# Patient Record
Sex: MALE | Race: WHITE
[De-identification: names, ages, dates, MRNs, and addresses within clinical notes are randomized per-mention and may not be internally consistent; named-entity substitution may affect disease eponyms.]

---

## 2017-01-14 ENCOUNTER — HOSPITAL ENCOUNTER (EMERGENCY)
Dept: HOSPITAL 25 - UCCORT | Age: 27
Discharge: HOME | End: 2017-01-14
Payer: COMMERCIAL

## 2017-01-14 VITALS — DIASTOLIC BLOOD PRESSURE: 72 MMHG | SYSTOLIC BLOOD PRESSURE: 124 MMHG

## 2017-01-14 DIAGNOSIS — Z88.6: ICD-10-CM

## 2017-01-14 DIAGNOSIS — K08.89: ICD-10-CM

## 2017-01-14 DIAGNOSIS — F17.210: ICD-10-CM

## 2017-01-14 DIAGNOSIS — K21.9: ICD-10-CM

## 2017-01-14 DIAGNOSIS — Z88.5: ICD-10-CM

## 2017-01-14 DIAGNOSIS — N34.2: Primary | ICD-10-CM

## 2017-01-14 DIAGNOSIS — Z91.048: ICD-10-CM

## 2017-01-14 PROCEDURE — 99212 OFFICE O/P EST SF 10 MIN: CPT

## 2017-01-14 PROCEDURE — 87491 CHLMYD TRACH DNA AMP PROBE: CPT

## 2017-01-14 PROCEDURE — 87591 N.GONORRHOEAE DNA AMP PROB: CPT

## 2017-01-14 PROCEDURE — 96372 THER/PROPH/DIAG INJ SC/IM: CPT

## 2017-01-14 PROCEDURE — G0463 HOSPITAL OUTPT CLINIC VISIT: HCPCS

## 2017-01-14 NOTE — UC
UC Dental HPI





- HPI Summary


HPI Summary: 





DENTAL PAIN X 7 DAYS , NO FEVER, NO CHILLS


PENILE DISCHARGE X 3 DAYS, + DYSURIA, , NO ABDOMINAL PAIN , NO NAUSEA OR 

VOMITING 





- History of Current Complaint


Chief Complaint: UCDentalProblem


Stated Complaint: DENTAL COMPLAINT,PERSONAL


Time Seen by Provider: 01/14/17 10:15


Hx Obtained From: Patient


Onset/Duration: Gradual Onset, Lasting Days - 5, Still Present


Severity: Moderate


Aggravating: Chewing


Alleviating: Nothing





- Allergies/Home Medications


Allergies/Adverse Reactions: 


 Allergies











Allergy/AdvReac Type Severity Reaction Status Date / Time


 


Meloxicam [From Mobic] Allergy Severe Itching Verified 01/14/17 09:51


 


Adhesive Tape Allergy  Rash Verified 01/14/17 09:51


 


Tramadol Allergy  Rash Verified 01/14/17 09:51














PMH/Surg Hx/FS Hx/Imm Hx


Endocrine History Of: 


   Denies: Diabetes, Thyroid Disease, Hyperthyroidism, Hypothyroidism, 

Dyslipidemia


Cardiovascular History Of: 


   Denies: Cardiac Disorders, Hypertension, Pacemaker/ICD, Myocardial Infarction

, Congestive Heart Failure, Atrial Fibrillation, Deep Vein Thrombosis, Bleeding 

Disorders


Respiratory History Of: 


   Denies: COPD, Asthma, Bronchitis, Pneumonia, Pulmonary Embolism


GI/ History Of: Reports: Gastroesophageal Reflux - He complains of heartburn 

but does not take anything for it.


   Denies: Ulcer, Gastrointestinal Bleed, Gall Bladder Disease, Kidney Stones, 

Diverticulitis, Renal Disease, Urosepsis


Neurological History Of: 


   Denies: TIA, CVA, Dementia, Seizures, Migraine


Psychological History Of: 


   Denies: Anxiety, Depression, Bipolar Disorder, Schizophrenia, Post Traumatic 

Stress Disorder


Cancer History Of: 


   Denies: Lung Cancer, Colorectal Cancer, Breast Cancer, Prostate Cancer, 

Cervical Cancer


Other History Of: 


   Negative For: HIV, Hepatitis B, Hepatitis C, Anticoagulant Therapy





- Surgical History


Surgical History: Yes


Surgery Procedure, Year, and Place: SX L WRIST FX.  Dental extractions 11/5/14.





- Family History


Known Family History: Positive: Hypertension


Family History: no family history of cardio, pulmonary vascular issues





- Social History


Alcohol Use: None


Substance Use Type: None


Substance Use Comment - Amount & Last Used: OCCASIONALLY


Smoking Status (MU): Heavy Every Day Tobacco Smoker


Type: Cigarettes


Amount Used/How Often: 1/2 PPD


Length of Time of Smoking/Using Tobacco: 10 yrs


Have You Smoked in the Last Year: Yes


When Did the Patient Quit Smoking/Using Tobacco: taking Chantix





- Immunization History


Most Recent Influenza Vaccination: 2015/2016


Most Recent Tetanus Shot: 2011





Review of Systems


Constitutional: Negative


Skin: Negative


Eyes: Negative


ENT: Dental Pain


Respiratory: Negative


Cardiovascular: Negative


Gastrointestinal: Negative


Genitourinary: Dysuria


All Other Systems Reviewed And Are Negative: Yes





Physical Exam


Triage Information Reviewed: Yes


Appearance: Well-Appearing, No Pain Distress, Well-Nourished


Vital Signs: 


 Initial Vital Signs











Temp  98.4 F   01/14/17 09:53


 


Pulse  102   01/14/17 09:53


 


Resp  16   01/14/17 09:53


 


BP  124/72   01/14/17 09:53


 


Pulse Ox  99   01/14/17 09:53











Vital Signs Reviewed: Yes


Eyes: Positive: Conjunctiva Clear


ENT: Positive: Normal ENT inspection, Hearing grossly normal, Pharynx normal


Dental: Positive: Percussion Tenderness @ - #14, Gross Decay/Caries @ - #14


Neck exam: Normal


Neck: Positive: Supple, Nontender, No Lymphadenopathy


Respiratory: Positive: Chest non-tender, Lungs clear, Normal breath sounds, No 

respiratory distress


Cardiovascular: Positive: RRR, No Murmur, Pulses Normal


Abdominal Exam: Normal


Abdomen Description: Positive: Nontender, Soft


Bowel Sounds: Positive: Present


Skin Exam: Normal





UC Physical Exam


Vital Signs On Initial Exam: 


 Initial Vitals











Temp Pulse Resp BP Pulse Ox


 


 98.4 F   102   16   124/72   99 


 


 01/14/17 09:53  01/14/17 09:53  01/14/17 09:53  01/14/17 09:53  01/14/17 09:53














- Genitalia Exam


Male Genitalia: Circumcised, Urethra With Discharge - CLEAR / WHITE


Male Genitalia Cont.: Bilateral: Testicles Descended





Dental Complaint Course/Dx





- Differential Dx/Diagnosis


Provider Diagnoses: DENTAL PAIN.  URETHRITIS





Discharge





- Discharge Plan


Condition: Stable


Disposition: HOME


Prescriptions: 


Naproxen [Naproxen 500 MG TABS] 500 mg PO BID #20 tab


Patient Education Materials:  Nonspecific Urethritis in Men (ED), Toothache (ED)


Referrals: 


No Primary Care Phys,NOPCP [Primary Care Provider] - 7 Days


Additional Instructions: 


WILL CHECK FOR GC/ [facility]


CALL THE OFFICE IN 2 DAYS FOR THE RESULTS 


WILL GIVE ROCEPHIN 250 MG IM AND ZITHROMAX 1000 MG PO X1

## 2017-01-26 ENCOUNTER — HOSPITAL ENCOUNTER (EMERGENCY)
Dept: HOSPITAL 25 - ED | Age: 27
Discharge: HOME | End: 2017-01-26
Payer: COMMERCIAL

## 2017-01-26 VITALS — DIASTOLIC BLOOD PRESSURE: 53 MMHG | SYSTOLIC BLOOD PRESSURE: 127 MMHG

## 2017-01-26 DIAGNOSIS — F17.210: ICD-10-CM

## 2017-01-26 DIAGNOSIS — N20.0: Primary | ICD-10-CM

## 2017-01-26 DIAGNOSIS — N13.30: ICD-10-CM

## 2017-01-26 LAB
ALBUMIN SERPL BCG-MCNC: 4.2 G/DL (ref 3.2–5.2)
ALP SERPL-CCNC: 46 U/L (ref 34–104)
ALT SERPL W P-5'-P-CCNC: 14 U/L (ref 7–52)
ANION GAP SERPL CALC-SCNC: 7 MMOL/L (ref 2–11)
AST SERPL-CCNC: 19 U/L (ref 13–39)
BUN SERPL-MCNC: 12 MG/DL (ref 6–24)
BUN/CREAT SERPL: 8.9 (ref 8–20)
CALCIUM SERPL-MCNC: 9.4 MG/DL (ref 8.6–10.3)
CHLORIDE SERPL-SCNC: 106 MMOL/L (ref 101–111)
GLOBULIN SER CALC-MCNC: 2.4 G/DL (ref 2–4)
GLUCOSE SERPL-MCNC: 99 MG/DL (ref 70–100)
HCO3 SERPL-SCNC: 25 MMOL/L (ref 22–32)
HCT VFR BLD AUTO: 45 % (ref 42–52)
HGB BLD-MCNC: 14.8 G/DL (ref 14–18)
MCH RBC QN AUTO: 31 PG (ref 27–31)
MCHC RBC AUTO-ENTMCNC: 33 G/DL (ref 31–36)
MCV RBC AUTO: 92 FL (ref 80–94)
POTASSIUM SERPL-SCNC: 4 MMOL/L (ref 3.5–5)
PROT SERPL-MCNC: 6.6 G/DL (ref 6.4–8.9)
RBC # BLD AUTO: 4.86 10^6/UL (ref 4–5.4)
SODIUM SERPL-SCNC: 138 MMOL/L (ref 133–145)
WBC # BLD AUTO: 12.4 10^3/UL (ref 3.5–10.8)

## 2017-01-26 PROCEDURE — 80053 COMPREHEN METABOLIC PANEL: CPT

## 2017-01-26 PROCEDURE — 86140 C-REACTIVE PROTEIN: CPT

## 2017-01-26 PROCEDURE — 81003 URINALYSIS AUTO W/O SCOPE: CPT

## 2017-01-26 PROCEDURE — 76775 US EXAM ABDO BACK WALL LIM: CPT

## 2017-01-26 PROCEDURE — 87086 URINE CULTURE/COLONY COUNT: CPT

## 2017-01-26 PROCEDURE — 81015 MICROSCOPIC EXAM OF URINE: CPT

## 2017-01-26 PROCEDURE — 99283 EMERGENCY DEPT VISIT LOW MDM: CPT

## 2017-01-26 PROCEDURE — 74000: CPT

## 2017-01-26 PROCEDURE — 85025 COMPLETE CBC W/AUTO DIFF WBC: CPT

## 2017-01-26 PROCEDURE — 36415 COLL VENOUS BLD VENIPUNCTURE: CPT

## 2017-01-26 NOTE — ED
Complaint/Male





- History of Current Complaint


Chief Complaint: EDUrogenitalProblems


Time Seen by Provider: 01/26/17 06:19


Hx Obtained From: Patient


Onset/Duration: Gradual Onset, Lasting Days - 2, Still Present


Timing: Constant


Severity Initially: Moderate


Severity Currently: Severe


Location: Flank - right


Character: Sharp


Aggravating Factor(s): Nothing


Alleviating Factor(s): Nothing





- Allergies/Home Medications


Allergies/Adverse Reactions: 


 Allergies











Allergy/AdvReac Type Severity Reaction Status Date / Time


 


Meloxicam [From Mobic] Allergy Severe Itching Verified 01/14/17 09:51


 


Adhesive Tape Allergy  Rash Verified 01/14/17 09:51


 


Tramadol Allergy  Rash Verified 01/14/17 09:51














PMH/Surg Hx/FS Hx/Imm Hx


Previously Healthy: Yes


Endocrine/Hematology History: 


   Denies: Hx Anticoagulant Therapy, Hx Diabetes, Hx Thyroid Disease


Cardiovascular History: 


   Denies: Hx Congestive Heart Failure, Hx Deep Vein Thrombosis, Hx Hypertension

, Hx Myocardial Infarction, Hx Pacemaker/ICD


Respiratory History: 


   Denies: Hx Asthma, Hx Chronic Obstructive Pulmonary Disease (COPD), Hx Lung 

Cancer, Hx Pneumonia, Hx Pulmonary Embolism


GI History: 


   Denies: Hx Gall Bladder Disease, Hx Gastrointestinal Bleed, Hx Ulcer, Hx 

Urosepsis


 History: 


   Denies: Hx Kidney Stones, Hx Renal Disease


Neurological History: 


   Denies: Hx Dementia, Hx Migraine, Hx Seizures, Hx Transient Ischemic Attacks 

(TIA)


Psychiatric History: Reports: Hx Substance Abuse


   Denies: Hx Anxiety, Hx Depression, Hx Schizophrenia, Hx Bipolar Disorder





- Surgical History


Surgery Procedure, Year, and Place: SX L WRIST FX.  Dental extractions 11/5/14.


Infectious Disease History: No


Infectious Disease History: 


   Denies: Hx Clostridium Difficile, Hx Hepatitis, Hx Human Immunodeficiency 

Virus (HIV), Hx of Known/Suspected MRSA, Hx Shingles, Hx Tuberculosis, Hx Known/

Suspected VRE, Hx Known/Suspected VRSA, History Other Infectious Disease, 

Traveled Outside the [place] in Last 30 Days





- Family History


Known Family History: Positive: Hypertension


Family History: no family history of cardio, pulmonary vascular issues





- Social History


Occupation: Employed Part-time


Lives: Alone


Alcohol Use: None


Substance Use Type: Reports: None


Substance Use Comment - Amount & Last Used: quit marijuana 7 mo ago


Smoking Status (MU): Heavy Every Day Tobacco Smoker


Type: Cigarettes


Amount Used/How Often: 1/2 PPD


Length of Time of Smoking/Using Tobacco: 10 yrs


Have You Smoked in the Last Year: Yes


Cessation Counseling: Patient Advised to Stop





Review Of Systems


Constitutional: Negative: Fever


Gastrointestinal: Negative: Abdominal Pain, Vomiting


Genitourinary: Positive: Other - flank pain


All Other Systems Reviewed And Are Negative: Yes





Physical Exam


Triage Information Reviewed: Yes


Vital Signs On Initial Exam: 


 Initial Vitals











Temp Pulse Resp BP Pulse Ox


 


 98.0 F   82   16   126/63   99 


 


 01/26/17 06:17  01/26/17 06:17  01/26/17 06:17  01/26/17 06:17  01/26/17 06:17











Vital Signs Reviewed: Yes


Appearance: Positive: Well-Appearing, No Pain Distress - Patient is resting 

comfortably on stretcher. He is pleasantly conversational and greets me with a 

smile and recognition from previous visits., Well-Nourished


Skin: Positive: Warm, Skin Color Reflects Adequate Perfusion, Dry, Soft


Head/Face: Positive: Normal Head/Face Inspection


Eyes: Positive: EOMI, KEENAN, Conjunctiva Clear


ENT: Positive: Hearing grossly normal


Respiratory/Lung Sounds: Positive: Clear to Auscultation, Breath Sounds Present


Cardiovascular: Positive: RRR


Abdomen Description: Positive: Nontender, Soft, CVA Tenderness (R) - patient 

reacts strongly to palpation.  Negative: CVA Tenderness (L), Distended, Guarding


Bowel Sounds: Positive: Present


Musculoskeletal: Positive: Strength/ROM Intact.  Negative: Edema Left, Edema 

Right


Neurological: Positive: Sensory/Motor Intact, Alert, Oriented to Person Place, 

Time, Normal Gait


Psychiatric: Positive: Affect/Mood Appropriate


AVPU Assessment: Alert





Diagnostics





- Vital Signs


 Vital Signs











  Temp Pulse Resp BP Pulse Ox


 


 01/26/17 06:17  98.0 F  82  16  126/63  99














- Laboratory


Lab Results: 


 Lab Results











  01/26/17 01/26/17 01/26/17 Range/Units





  06:33 06:33 06:33 


 


WBC  12.4 H    (3.5-10.8)  10^3/ul


 


RBC  4.86    (4.0-5.4)  10^6/ul


 


Hgb  14.8    (14.0-18.0)  g/dl


 


Hct  45    (42-52)  %


 


MCV  92    (80-94)  fL


 


MCH  31    (27-31)  pg


 


MCHC  33    (31-36)  g/dl


 


RDW  13    (10.5-15)  %


 


Plt Count  195    (150-450)  10^3/ul


 


MPV  9    (7.4-10.4)  um3


 


Neut % (Auto)  70.1    (38-83)  %


 


Lymph % (Auto)  20.1 L    (25-47)  %


 


Mono % (Auto)  8.1    (1-9)  %


 


Eos % (Auto)  1.4    (0-6)  %


 


Baso % (Auto)  0.3    (0-2)  %


 


Absolute Neuts (auto)  8.7 H    (1.5-7.7)  10^3/ul


 


Absolute Lymphs (auto)  2.5    (1.0-4.8)  10^3/ul


 


Absolute Monos (auto)  1.0 H    (0-0.8)  10^3/ul


 


Absolute Eos (auto)  0.2    (0-0.6)  10^3/ul


 


Absolute Basos (auto)  0    (0-0.2)  10^3/ul


 


Absolute Nucleated RBC  0    10^3/ul


 


Nucleated RBC %  0    


 


Sodium    138  (133-145)  mmol/L


 


Potassium    4.0  (3.5-5.0)  mmol/L


 


Chloride    106  (101-111)  mmol/L


 


Carbon Dioxide    25  (22-32)  mmol/L


 


Anion Gap    7  (2-11)  mmol/L


 


BUN    12  (6-24)  mg/dL


 


Creatinine    1.35 H  (0.67-1.17)  mg/dL


 


Est GFR ( Amer)    82.8  (>60)  


 


Est GFR (Non-Af Amer)    64.4  (>60)  


 


BUN/Creatinine Ratio    8.9  (8-20)  


 


Glucose    99  ()  mg/dL


 


Calcium    9.4  (8.6-10.3)  mg/dL


 


Total Bilirubin    0.40  (0.2-1.0)  mg/dL


 


AST    19  (13-39)  U/L


 


ALT    14  (7-52)  U/L


 


Alkaline Phosphatase    46  ()  U/L


 


C-Reactive Protein    1.99  (< 5.00)  mg/L


 


Total Protein    6.6  (6.4-8.9)  g/dL


 


Albumin    4.2  (3.2-5.2)  g/dL


 


Globulin    2.4  (2-4)  g/dL


 


Albumin/Globulin Ratio    1.8  (1-3)  


 


Urine Color   Yellow   


 


Urine Appearance   Cloudy   


 


Urine pH   5.0   (5-9)  


 


Ur Specific Gravity   1.027   (1.010-1.030)  


 


Urine Protein   1+(30 mg/dl) H   (Negative)  


 


Urine Ketones   Negative   (Negative)  


 


Urine Blood   3+ H   (Negative)  


 


Urine Nitrate   Negative   (Negative)  


 


Urine Bilirubin   Negative   (Negative)  


 


Urine Urobilinogen   Negative   (Negative)  


 


Ur Leukocyte Esterase   2+ H   (Negative)  


 


Urine WBC (Auto)   3+(>20/hpf) H   (Absent)  


 


Urine RBC (Auto)   3+(>10/hpf) H   (Absent)  


 


Ur Squamous Epith Cells   Present H   (Absent)  


 


Uric Acid Crystals   Present H   (Absent)  


 


Urine Bacteria   Absent   (Absent)  


 


Urine Sperm   Present H   (Absent)  


 


Urine Glucose   Negative   (Negative)  











Result Diagrams: 


 01/26/17 06:33





 01/26/17 06:33


Lab Statement: Any lab studies that have been ordered have been reviewed, and 

results considered in the medical decision making process.





- Radiology


  ** No standard instances


Xray Interpretation: No Acute Changes - No calculi noted on this study


Radiology Interpretation Completed By: Radiologist





- Ultrasound


  ** No standard instances


Ultrasound Interpretation: Positive (See Comments)


Ultrasound Interpretation Completed By: Radiologist - 5mm calculi at the right 

UVJ with miild hydronephrosis





 Complaint Male Course/Dx





- Course


Course Of Treatment: Patient's pain improved without pain medication and he 

asked to leave, and will follow-up with urology doctors he was given 

information on from [facility].





- Differential Dx/Diagnosis


Differential Diagnosis/HQI/PQRI: Epididymitis, Pyelonephritis, Testicular 

Torsion, Ureteral Calculi, Urinary Tract Infection


Provider Diagnoses: Right renal calculi

## 2017-01-26 NOTE — RAD
INDICATION:  Right flank abdominal pain.



COMPARISON:  Comparison is made with a prior right renal ultrasound study of the same day.



TECHNIQUE: Frontal supine films of the abdomen were obtained.



FINDINGS: The small bowel and colon appear nondistended. 



The calculus at the right ureterovesical junction noted on the ultrasound study is not

visualized on this x-ray study.



IMPRESSION:  NEGATIVE EXAM, THE PREVIOUSLY NOTED RIGHT URETERAL URETEROVESICAL JUNCTION

CALCULUS IS NOT VISIBLE ON THIS STUDY.

## 2017-01-26 NOTE — RAD
Indication: Right flank pain.



Real-time sonography of the kidneys was performed.



The right kidney measures 10.3 x 4.6 x 5.7 cm with mild hydronephrosis. The right ureter

is mildly distended. There is a 5 mm calculus in the right ureterovesicular junction.

There are bilateral ureteral jets identified however.



IMPRESSION: Mild right hydronephrosis with hydroureter. 5 mm calculi is noted in the right

ureterovesicular junction.

## 2017-07-05 ENCOUNTER — HOSPITAL ENCOUNTER (EMERGENCY)
Dept: HOSPITAL 25 - UCEAST | Age: 27
Discharge: HOME | End: 2017-07-05
Payer: COMMERCIAL

## 2017-07-05 VITALS — SYSTOLIC BLOOD PRESSURE: 107 MMHG | DIASTOLIC BLOOD PRESSURE: 60 MMHG
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
## 2017-07-05 DIAGNOSIS — S61.210A: Primary | ICD-10-CM

## 2017-07-05 DIAGNOSIS — Z23: ICD-10-CM

## 2017-07-05 PROCEDURE — 90471 IMMUNIZATION ADMIN: CPT

## 2017-07-05 PROCEDURE — 99213 OFFICE O/P EST LOW 20 MIN: CPT

## 2017-07-05 PROCEDURE — 12001 RPR S/N/AX/GEN/TRNK 2.5CM/<: CPT

## 2017-07-05 PROCEDURE — 99212 OFFICE O/P EST SF 10 MIN: CPT

## 2017-07-05 PROCEDURE — G0463 HOSPITAL OUTPT CLINIC VISIT: HCPCS

## 2017-07-05 NOTE — UC
Laceration HPI





- HPI Summary


HPI Summary: 


WAS WASHING DISHES ABOUT 2 HRS PTA., GLASS BROKE AND LACERATED RIGHT INDEX 

FINGER. LAST TETANUS 2-3 YEARS AGO.





- History Of Current Complaint


Chief Complaint: UCLaceration


Stated Complaint: FINGER LAC


Time Seen by Provider: 07/05/17 20:48


Hx Obtained From: Patient


Laceration Location: Finger - RIGHT INDEX


Mechanism Of Injury: Sharp Trauma


Onset/Duration: Sudden Onset, Lasting Hours, Still Present


Severity: Moderate


Pain Intensity: 4


Pain Scale Used: 0-10 Numeric


Aggravating Factors: Movement





- Allergies/Home Medications


Allergies/Adverse Reactions: 


 Allergies











Allergy/AdvReac Type Severity Reaction Status Date / Time


 


Meloxicam [From Tripvisto] Allergy Severe Itching Verified 07/05/17 20:11


 


Adhesive Tape Allergy  Rash Verified 07/05/17 20:11


 


Tramadol Allergy  Rash Verified 07/05/17 20:11











Home Medications: 


 Home Medications





NK [No Home Medications Reported]  07/05/17 [History Confirmed 07/05/17]











PMH/Surg Hx/FS Hx/Imm Hx





- Additional Past Medical History


Additional PMH: 





ADHD


Other History Of: 


   Negative For: HIV, Hepatitis B, Hepatitis C, Anticoagulant Therapy





- Surgical History


Surgical History: Yes


Surgery Procedure, Year, and Place: SX L WRIST FX.  Dental extractions 11/5/14.





- Family History


Known Family History: 


   Negative: Hypertension


Family History: no family history of cardio, pulmonary vascular issues





- Social History


Alcohol Use: None


Substance Use Type: None


Substance Use Comment - Amount & Last Used: quit marijuana 7 mo ago


Smoking Status (MU): Heavy Every Day Tobacco Smoker


Type: Cigarettes


Amount Used/How Often: 1/2 PPD


Length of Time of Smoking/Using Tobacco: 10 yrs


Have You Smoked in the Last Year: Yes


When Did the Patient Quit Smoking/Using Tobacco: taking Chantix





- Immunization History


Most Recent Influenza Vaccination: 2015/2016


Most Recent Tetanus Shot: 2011





Review of Systems


Constitutional: Negative


Skin: Other - LACERATION RIGHT INDEX FINGER


Respiratory: Negative


Cardiovascular: Negative


Gastrointestinal: Negative


Neurovascular: Negative


All Other Systems Reviewed And Are Negative: Yes





Physical Exam


Triage Information Reviewed: Yes


Appearance: Well-Appearing, No Pain Distress, Well-Nourished


Vital Signs: 


 Initial Vital Signs











Temp  97.5 F   07/05/17 20:07


 


Pulse  82   07/05/17 20:07


 


Resp  16   07/05/17 20:07


 


BP  107/60   07/05/17 20:07











Vital Signs Reviewed: Yes


Eyes: Positive: Conjunctiva Clear


ENT: Positive: Hearing grossly normal


Neck: Positive: Supple


Respiratory: Positive: No respiratory distress, No accessory muscle use


Cardiovascular: Positive: Pulses Normal


Abdomen Description: Positive: Soft


Musculoskeletal: Positive: ROM Intact, No Edema


Neurological: Positive: Alert


Psychological: Positive: Age Appropriate Behavior


Skin: Positive: Other - 2.5CM LINEAR LACERATION RIGHT INDEX FINGER JUST DISTAL 

TO MCP JOINT. SLIGHTLY GAPING. NO ACTIVE BLEEDING





Laceration Repair





- Laceration Repair


  ** 1


Description: Linear


Laceration Size After Repair: Length (cm) - 2.5CM, Width (mm) - 0MM, Depth (mm) 

- 2MM


Modified For Repair: No


Irrigation With Pressure Irrigation Device: Yes


Closure Material: SteriStrips





Laceration Course/Dx





- Course/Dx


Course Of Treatment: ADVISED PT THAT SUTURES WERE MORE APPROPRIATE FOR CLOSURE 

OF THIS WOUND. PT DECLINES. PREFERS GLUE OR STERISTRIPS. WOUND SLIGHTLY GAPING 

- NOT SUITABLE FOR GLUE. SKIN EDGES PULLED TOGETHER USING STERISTRIPS WITH 

SATISFACTORY RESULTS.





- Differential Dx - Laceration/Wound


Provider Diagnoses: LACERATION REPAIR RIGHT INDEX FINGER - STERISTRIPS





Discharge





- Discharge Plan


Condition: Stable


Disposition: HOME


Patient Education Materials:  Finger Laceration (ED)


Additional Instructions: 


THE STERISTRIPS WILL FALL OFF ON THEIR OWN IN THE NEXT 1-2 WEEKS. DO NOT PUT 

ANY OINTMENT ON TOP OF THEM. DO NOT SUBMERGE IN WATER FOR PROLONGED PERIOD OF 

TIME. OKAY FOR BRIEF SHOWER AFTER 24 HOURS AND THEN BE SURE TO DRY COMPLETELY.





TRY NOT TO BEND YOUR FINGER WHILE YOU ARE HEALING. SPLINT PROVIDED FOR USE AS 

NEEDED.





SEEK FOLLOW-UP IF YOU DEVELOP SPREADING REDNESS OF THE SKIN, PURULENT DRAINAGE, 

FEVER, INCREASED PAIN OR ANY OTHER CONCERNING SYMPTOMS.





CALL THE NUMBER BELOW FOR ASSISTANCE IN ESTABLISHING WITH A PCP


An additional resource available to assist in finding the appropriate physician 

for your health care needs is [facility] ([facility]).  

You may contact them by calling [phone].

## 2017-08-07 ENCOUNTER — HOSPITAL ENCOUNTER (EMERGENCY)
Dept: HOSPITAL 25 - UCEAST | Age: 27
Discharge: HOME | End: 2017-08-07
Payer: COMMERCIAL

## 2017-08-07 ENCOUNTER — HOSPITAL ENCOUNTER (EMERGENCY)
Dept: HOSPITAL 25 - ED | Age: 27
Discharge: HOME | End: 2017-08-07
Payer: COMMERCIAL

## 2017-08-07 VITALS — SYSTOLIC BLOOD PRESSURE: 134 MMHG | DIASTOLIC BLOOD PRESSURE: 70 MMHG

## 2017-08-07 VITALS — SYSTOLIC BLOOD PRESSURE: 144 MMHG | DIASTOLIC BLOOD PRESSURE: 82 MMHG

## 2017-08-07 DIAGNOSIS — S02.5XXA: Primary | ICD-10-CM

## 2017-08-07 DIAGNOSIS — T39.1X5A: ICD-10-CM

## 2017-08-07 DIAGNOSIS — K08.89: Primary | ICD-10-CM

## 2017-08-07 DIAGNOSIS — F17.210: ICD-10-CM

## 2017-08-07 DIAGNOSIS — F17.290: ICD-10-CM

## 2017-08-07 DIAGNOSIS — Z88.5: ICD-10-CM

## 2017-08-07 DIAGNOSIS — K08.89: ICD-10-CM

## 2017-08-07 LAB
ALBUMIN SERPL BCG-MCNC: 3.9 G/DL (ref 3.2–5.2)
ALP SERPL-CCNC: 42 U/L (ref 34–104)
ALT SERPL W P-5'-P-CCNC: 21 U/L (ref 7–52)
ANION GAP SERPL CALC-SCNC: 4 MMOL/L (ref 2–11)
APAP SERPL-MCNC: < 15 MCG/ML
AST SERPL-CCNC: 19 U/L (ref 13–39)
BUN SERPL-MCNC: 14 MG/DL (ref 6–24)
BUN/CREAT SERPL: 13.9 (ref 8–20)
CALCIUM SERPL-MCNC: 9 MG/DL (ref 8.6–10.3)
CHLORIDE SERPL-SCNC: 105 MMOL/L (ref 101–111)
GLOBULIN SER CALC-MCNC: 2.5 G/DL (ref 2–4)
GLUCOSE SERPL-MCNC: 109 MG/DL (ref 70–100)
HCO3 SERPL-SCNC: 27 MMOL/L (ref 22–32)
HCT VFR BLD AUTO: 45 % (ref 42–52)
HGB BLD-MCNC: 14.9 G/DL (ref 14–18)
MCH RBC QN AUTO: 31 PG (ref 27–31)
MCHC RBC AUTO-ENTMCNC: 33 G/DL (ref 31–36)
MCV RBC AUTO: 94 FL (ref 80–94)
POTASSIUM SERPL-SCNC: 4.5 MMOL/L (ref 3.5–5)
PROT SERPL-MCNC: 6.4 G/DL (ref 6.4–8.9)
RBC # BLD AUTO: 4.8 10^6/UL (ref 4–5.4)
SODIUM SERPL-SCNC: 136 MMOL/L (ref 133–145)
WBC # BLD AUTO: 8.4 10^3/UL (ref 3.5–10.8)

## 2017-08-07 PROCEDURE — 99211 OFF/OP EST MAY X REQ PHY/QHP: CPT

## 2017-08-07 PROCEDURE — 80329 ANALGESICS NON-OPIOID 1 OR 2: CPT

## 2017-08-07 PROCEDURE — G0480 DRUG TEST DEF 1-7 CLASSES: HCPCS

## 2017-08-07 PROCEDURE — 99282 EMERGENCY DEPT VISIT SF MDM: CPT

## 2017-08-07 PROCEDURE — G0463 HOSPITAL OUTPT CLINIC VISIT: HCPCS

## 2017-08-07 PROCEDURE — 80053 COMPREHEN METABOLIC PANEL: CPT

## 2017-08-07 PROCEDURE — 36415 COLL VENOUS BLD VENIPUNCTURE: CPT

## 2017-08-07 PROCEDURE — 85025 COMPLETE CBC W/AUTO DIFF WBC: CPT

## 2017-08-07 NOTE — ED
Throat Pain/Nasal Congestion





- HPI Summary


HPI Summary: 





27 male presents from [facility] with complaints of right upper tooth pain "right 

upper tooth completely broke it is just a hole and throbbing that began Friday 8 /4/17 after it breaking off. Patient states he since has had severe pain and is 

unable to eat/drink due to the sensitivity. Patient has had pain like this 

before when he did the same thing to a different tooth years ago. Patient 

states he has been taking ~ 5,200mg of tylenol a day along with 4,000mg of 

ibuprofen. Alternating every 4 hours for the past 2.5 days which is of concern 

and reason he was sent from . Has a dentist appointment Wednesday however 

went to urgent care for concern of infection and in need of an antibiotic. 

Patient states he last had ibuprofen this morning around 8am ~1000mg but has 

not taken Tylenol since yesterday. Denies any abdominal pain, nausea, fever/

chills, redness, ascbess and vomiting. Has been using bathroom normally. No 

PMHx. 





- History of Current Complaint


Chief Complaint: EDDentalPain


Time Seen by Provider: 08/07/17 09:09


Hx Obtained From: Patient


Onset/Duration: Sudden Onset, Lasting Days, Still Present, Worse Since


Severity: Severe


Associated Signs And Symptoms: Positive: Negative


Cough: None





- Allergies/Home Medications


Allergies/Adverse Reactions: 


 Allergies











Allergy/AdvReac Type Severity Reaction Status Date / Time


 


Meloxicam [From Mob] Allergy Severe Itching Verified 08/07/17 08:50


 


Adhesive Tape Allergy  Rash Verified 08/07/17 08:50


 


Tramadol Allergy  Rash Verified 08/07/17 08:50














PMH/Surg Hx/FS Hx/Imm Hx


Endocrine/Hematology History: 


   Denies: Hx Anticoagulant Therapy, Hx Diabetes, Hx Thyroid Disease


Cardiovascular History: 


   Denies: Hx Congestive Heart Failure, Hx Deep Vein Thrombosis, Hx Hypertension

, Hx Myocardial Infarction, Hx Pacemaker/ICD


Respiratory History: 


   Denies: Hx Asthma, Hx Chronic Obstructive Pulmonary Disease (COPD), Hx Lung 

Cancer, Hx Pneumonia, Hx Pulmonary Embolism


GI History: 


   Denies: Hx Gall Bladder Disease, Hx Gastrointestinal Bleed, Hx Ulcer, Hx 

Urosepsis


 History: 


   Denies: Hx Kidney Stones, Hx Renal Disease


Neurological History: 


   Denies: Hx Dementia, Hx Migraine, Hx Seizures, Hx Transient Ischemic Attacks 

(TIA)


Psychiatric History: Reports: Hx Substance Abuse


   Denies: Hx Anxiety, Hx Depression, Hx Schizophrenia, Hx Bipolar Disorder





- Surgical History


Surgery Procedure, Year, and Place: SX L WRIST FX.  Dental extractions 11/5/14.





- Immunization History


Immunizations Up to Date: Yes


Infectious Disease History: No


Infectious Disease History: 


   Denies: Hx Clostridium Difficile, Hx Hepatitis, Hx Human Immunodeficiency 

Virus (HIV), Hx of Known/Suspected MRSA, Hx Shingles, Hx Tuberculosis, Hx Known/

Suspected VRE, Hx Known/Suspected VRSA, History Other Infectious Disease, 

Traveled Outside the [place] in Last 30 Days





- Family History


Known Family History: 


   Negative: Hypertension


Family History: no family history of cardio, pulmonary vascular issues





- Social History


Alcohol Use: None


Substance Use Type: Reports: None


Substance Use Comment - Amount & Last Used: quit marijuana 7 mo ago


Smoking Status (MU): Heavy Every Day Tobacco Smoker


Type: Cigarettes


Amount Used/How Often: 1 PPD


Length of Time of Smoking/Using Tobacco: 10 yrs


Have You Smoked in the Last Year: Yes





Review of Systems


Constitutional: Negative


Eyes: Negative


Positive: Dental Pain


Cardiovascular: Negative


Respiratory: Negative


Gastrointestinal: Negative


Musculoskeletal: Negative


Skin: Negative


Neurological: Negative


All Other Systems Reviewed And Are Negative: Yes





Physical Exam


Triage Information Reviewed: Yes


Vital Signs On Initial Exam: 


 Initial Vitals











Temp Pulse Resp BP Pulse Ox


 


 98.2 F   79   16   147/90   99 


 


 08/07/17 08:50  08/07/17 08:50  08/07/17 08:50  08/07/17 08:50  08/07/17 08:50











Vital Signs Reviewed: Yes


Appearance: Positive: Well-Appearing, No Pain Distress, Well-Nourished


Skin: Positive: Warm, Skin Color Reflects Adequate Perfusion, Dry.  Negative: 

Soft, Pale, Erythema @


Head/Face: Positive: Normal Head/Face Inspection


Eyes: Positive: Normal, Conjunctiva Clear


ENT: Positive: Hearing grossly normal, Pharynx normal.  Negative: TMs normal, 

TM bulging, Trismus, Muffled/hoarse voice


Dental: Positive: Percussion Tenderness @ - right maxillary, Gross Decay/Caries 

@, Dental Fracture @ - right upper canine exposed nerve area at gums, tooth 

completely missing. exquistetly tender. multiple other dental fractures noted 

however of no concern at this time.  Negative: Abscess @, Cellulitis @, 

Cervical Lymphadenopathy


Neck: Positive: Supple, Nontender, No Lymphadenopathy


Respiratory/Lung Sounds: Positive: Clear to Auscultation, Breath Sounds 

Present.  Negative: Rales, Rhonchi, Wheezes


Cardiovascular: Positive: Normal, RRR, Pulses are Symmetrical in both Upper and 

Lower Extremities.  Negative: Murmur, Rub


Abdomen Description: Positive: Nontender, No Organomegaly, Soft.  Negative: 

Bruit, CVA Tenderness (R), CVA Tenderness (L), Distended, Guarding, McBurney's 

Point Tenderness, Peritoneal Signs, Pulsatile Mass


Bowel Sounds: Positive: Present


Musculoskeletal: Positive: Normal, Strength/ROM Intact


Neurological: Positive: Normal, Sensory/Motor Intact, Alert, Oriented to Person 

Place, Time


Psychiatric: Positive: Affect/Mood Appropriate





- Len Coma Scale


Coma Scale Total: 15





Diagnostics





- Vital Signs


 Vital Signs











  Temp Pulse Resp BP Pulse Ox


 


 08/07/17 09:15  98.2 F  79  16  147/90  98


 


 08/07/17 08:50  98.2 F  79  16  147/90  99














- Laboratory


Lab Results: 


 Lab Results











  08/07/17 Range/Units





  09:30 


 


WBC  8.4  (3.5-10.8)  10^3/ul


 


RBC  4.80  (4.0-5.4)  10^6/ul


 


Hgb  14.9  (14.0-18.0)  g/dl


 


Hct  45  (42-52)  %


 


MCV  94  (80-94)  fL


 


MCH  31  (27-31)  pg


 


MCHC  33  (31-36)  g/dl


 


RDW  14  (10.5-15)  %


 


Plt Count  163  (150-450)  10^3/ul


 


MPV  8  (7.4-10.4)  um3


 


Neut % (Auto)  69.9  (38-83)  %


 


Lymph % (Auto)  14.9 L  (25-47)  %


 


Mono % (Auto)  13.9 H  (1-9)  %


 


Eos % (Auto)  0.7  (0-6)  %


 


Baso % (Auto)  0.6  (0-2)  %


 


Absolute Neuts (auto)  5.9  (1.5-7.7)  10^3/ul


 


Absolute Lymphs (auto)  1.2  (1.0-4.8)  10^3/ul


 


Absolute Monos (auto)  1.2 H  (0-0.8)  10^3/ul


 


Absolute Eos (auto)  0.1  (0-0.6)  10^3/ul


 


Absolute Basos (auto)  0.1  (0-0.2)  10^3/ul


 


Absolute Nucleated RBC  0  10^3/ul


 


Nucleated RBC %  0  











Result Diagrams: 


 08/07/17 09:30





 08/07/17 09:30


Lab Statement: Any lab studies that have been ordered have been reviewed, and 

results considered in the medical decision making process.





EENT Course/Dx





- Course


Course Of Treatment: labs obtained to check liver and kidney function after 

taking too much tylenol/ibuprofen for his dental pain. Denies any other 

complaints or symptoms of pain other than dental pain. Given normal saline 

bolus however refused to obtain as he just wanted to leave once lab results 

were recieved and normal. No concern for acetaminophen toxicity or liver/kidney 

damage. Educated on proper use of tylenol and ibuprofen. Given pain management 

and to only take directed amount. amoxicillin for infection. drink plenty of 

fluids. follow up dentist wednesday. Aware of worsening signs and symptoms to 

watch out for.





- Differential Diagnoses


Differential Diagnoses: Dental Abscess, Dental Caries, Fractured Tooth





- Diagnoses


Provider Diagnoses: 


 Fractured tooth, Pain, dental








Discharge





- Discharge Plan


Condition: Stable


Disposition: HOME


Prescriptions: 


Amoxicillin PO (*) [Amoxicillin 500 MG CAP*] 500 mg PO Q12H #19 cap


HYDROcodone/ACETAMIN 5-325 MG* [Norco 5-325 TAB*] 1 tab PO Q6H PRN #20 tab MDD 3


 PRN Reason: Pain


Patient Education Materials:  Toothache (ED)


Referrals: 


[facility] [place] [Outside]


No Primary Care Phys,NOPCP [Primary Care Provider] - 


Additional Instructions: 


Please do not take over the directed dose of tylenol or ibuprofen for pain. 


Take prescribed pain medication for pain every 6 hours you may take no more 

than 600mg of ibuprofen in between doses. Take with food.


Take prescribed antibiotic for infection.


Drink plenty of fluids for the next couple of days.


Follow up with dentist on Wednesday. 


If symptoms worsen or new symptoms develop please seek medical attention 

promptly.

## 2017-08-07 NOTE — UC
Rose LAMAS Alfonso, scribed for Bambi Walker DO on 08/07/17 at 0722 .





 Dental HPI





- HPI Summary


HPI Summary: 





This patient is a 27 year old M presenting to [facility] with a chief complaint of 

dental pain since 2 days ago. He reports that right upper tooth completely 

broke it is just a hole. Pt rates the worsening throbbing pain 8/10 in 

severity. Symptoms aggravated and alleviated by nothing. Symptoms slightly 

alleviated by Ibuprofen and Tylenol (wears off in a half hour). He reports 4000 

mg of Ibuprofen a day for two days. He reports consuming 9750 mg of Tylenol a 

day for two days. He states he has a dentist appointment scheduled in two days. 

The patient reports insomnia, diaphoresis, and nausea. The patient denies fever

, chills, vomiting, abdominal pain, headache, rash, SOB, and CP. Pt admits to 

tobacco abuse disorder. Denies PMHx of valvular disease. FHX of HTN and DM. 

Hypertension noted, but due to patients condition.





Upon leaving the urgent care , pt stated he was just going to go home.  That he 

was tired.  I followed the pt to the door, stopped him and urged him to go to 

the ed.   I again told him the risks associated with tylenol overdose including 

the risk of permanant liver damage and death.  I told him that we would send 

him in an ambulance at no cost to him, but he repeatedly declined the ambulance 

transfer but stated he would go to the ed.








By 09:15am we confirmed that pt made it to ed and blood work was pending.





- History of Current Complaint


Chief Complaint: UCDentalProblem


Stated Complaint: DENTAL PAIN


Time Seen by Provider: 08/07/17 07:14


Hx Obtained From: Patient


Onset/Duration: Sudden Onset, Lasting Days - 2, Worse Since


Severity: Severe


Pain Intensity: 8


Pain Scale Used: 0-10 Numeric


Aggravating: Nothing


Alleviating: Nothing





- Allergies/Home Medications


Allergies/Adverse Reactions: 


 Allergies











Allergy/AdvReac Type Severity Reaction Status Date / Time


 


Meloxicam [From Mobic] Allergy Severe Itching Verified 08/07/17 08:50


 


Adhesive Tape Allergy  Rash Verified 08/07/17 08:50


 


Tramadol Allergy  Rash Verified 08/07/17 08:50











Home Medications: 


 Home Medications





Acetaminophen [Tylenol] 3 tab PO Q4HR PRN 08/07/17 [History Confirmed 08/07/17]


Ibuprofen [Advil] 1,000 mg PO Q6HR PRN 08/07/17 [History Confirmed 08/07/17]











PMH/Surg Hx/FS Hx/Imm Hx


Previously Healthy: Yes


Other Cardiovascular History: Negative valvular disease


Other History Of: 


   Negative For: HIV, Hepatitis B, Hepatitis C, Anticoagulant Therapy





- Surgical History


Surgical History: Yes


Surgery Procedure, Year, and Place: SX L WRIST FX.  Dental extractions 11/5/14.





- Family History


Known Family History: Positive: Hypertension, Diabetes


   Negative: Cardiac Disease


Family History: no family history of cardio, pulmonary vascular issues





- Social History


Lives: With Family


Alcohol Use: None


Substance Use Type: None


Substance Use Comment - Amount & Last Used: quit marijuana 7 mo ago


Smoking Status (MU): Heavy Every Day Tobacco Smoker


Type: Cigarettes


Amount Used/How Often: 1 PPD


Length of Time of Smoking/Using Tobacco: 10 yrs


Have You Smoked in the Last Year: Yes


When Did the Patient Quit Smoking/Using Tobacco: taking Chantix


Household Exposure Type: Cigarettes


Cessation Counseling: Patient Advised to Stop





- Immunization History


Most Recent Influenza Vaccination: 2015/2016


Most Recent Tetanus Shot: 2011





Review of Systems


Constitutional: Other - Positive diaphoresis; negative fever, chills


Skin: Other - Negative rash


Respiratory: Other - Negative SOB


Cardiovascular: Other - Negative CP


Gastrointestinal: Nausea, Other - Negative vomiting, abd pain


Neurological: Other - Positive insomnia; negative headache


Psychological: Negative


All Other Systems Reviewed And Are Negative: Yes





Physical Exam


Triage Information Reviewed: Yes


Appearance: Well-Appearing, No Pain Distress, Well-Nourished


Vital Signs: 


 Initial Vital Signs











Temp  98.2 F   08/07/17 07:13


 


Pulse  75   08/07/17 07:13


 


Resp  18   08/07/17 07:13


 


BP  134/70   08/07/17 07:13


 


Pulse Ox  100   08/07/17 07:13











Vital Signs Reviewed: Yes


Eyes: Positive: Conjunctiva Clear.  Negative: Discharge


ENT: Positive: Hearing grossly normal.  Negative: Muffled/hoarse voice


Dental: Positive: Dental Fracture @ - Fractured canine tooth noted in upper 

right.


Neck exam: Normal


Neck: Positive: Supple


Respiratory: Positive: Lungs clear, Normal breath sounds, No respiratory 

distress, No accessory muscle use


Cardiovascular: Positive: RRR, No Murmur


Abdomen Description: Positive: Nontender, Soft.  Negative: Distended, Guarding


Musculoskeletal Exam: Normal


Neurological: Positive: Alert, Muscle Tone Normal


Psychological Exam: Normal


Psychological: Positive: Age Appropriate Behavior


Skin Exam: Normal, Other - Warm, dry, normal color





Dental Complaint Course/Dx





- Course


Course Of Treatment: elevated bp noted.  likely due to pt condition.





- Differential Dx/Diagnosis


Differential Diagnosis/Dx: Dental Abscess, Dental Caries, Fractured Tooth, TMJ 

Syndrome, Other - tylenol toxicity


Provider Diagnoses: toothache, tylenol toxicity





Discharge





- Discharge Plan


Condition: Stable


Disposition: AGAINST MEDICAL ADVICE


Patient Education Materials:  Toothache (ED)


Referrals: 


No Primary Care Phys,NOPCP [Primary Care Provider] - 


Additional Instructions: 


WE ARE VERY CONCERNED ABOUT THE AMOUNT OF TYLENOL YOU TAKEN.  IT IS VERY 

IMPORTANT THAT YOU GO TO THE EMERGENCY DEPARTMENT FOR COMPLETE EVALUATION 

IMMEDIATELY.





We have recommended ambulance transfer. You have declined. The risks associated 

with your condition included increased pain, worsening infection, perment liver 

damage, and death. It is very important you go to the hospital immediately 

without stopping on the way. 





The documentation as recorded by the Rose hernandez Alfonso accurately reflects 

the service I personally performed and the decisions made by me, Bambi Walker DO.

## 2017-08-08 ENCOUNTER — HOSPITAL ENCOUNTER (EMERGENCY)
Dept: HOSPITAL 25 - ED | Age: 27
Discharge: HOME | End: 2017-08-08
Payer: COMMERCIAL

## 2017-08-08 VITALS — DIASTOLIC BLOOD PRESSURE: 76 MMHG | SYSTOLIC BLOOD PRESSURE: 146 MMHG

## 2017-08-08 DIAGNOSIS — K02.9: ICD-10-CM

## 2017-08-08 DIAGNOSIS — K04.7: Primary | ICD-10-CM

## 2017-08-08 DIAGNOSIS — F17.210: ICD-10-CM

## 2017-08-08 PROCEDURE — 99282 EMERGENCY DEPT VISIT SF MDM: CPT

## 2017-08-08 PROCEDURE — 96372 THER/PROPH/DIAG INJ SC/IM: CPT

## 2017-08-08 NOTE — ED
Rose LAMAS Alfonso, scribed for Layton Fontenot MD on 08/08/17 at 0740 .





Complex/Multi-Sys Presentation





- HPI Summary


HPI Summary: 





This patient is a 27 year old M BIBA to [facility] with a chief complaint of right 

upper dental pain since three days ago. He states the pain is progressively 

worsening. The patient rates the pain 8/10 in severity currently. Symptoms 

aggravated and alleviated by nothing. Patient reports a headache and right 

sided facial swelling. He presented to [facility] and [facility] yesterday for this 

complaint.





- History Of Current Complaint


Chief Complaint: EDDentalPain


Hx Obtained From: Patient


Onset/Duration: Sudden Onset, Lasting Days - 3, Worse Since


Timing: Constant


Severity Currently: Moderate


Severity Initially: Moderate


Aggravating Factor(s): Nothing


Alleviating Factor(s): Nothing


Associated Signs And Symptoms: Positive: Other - Positive headache and right 

sided facial swelling





- Allergies/Home Medications


Allergies/Adverse Reactions: 


 Allergies











Allergy/AdvReac Type Severity Reaction Status Date / Time


 


Meloxicam [From Mobic] Allergy Severe Itching Verified 08/07/17 08:50


 


Adhesive Tape Allergy  Rash Verified 08/07/17 08:50


 


Tramadol Allergy  Rash Verified 08/07/17 08:50














PMH/Surg Hx/FS Hx/Imm Hx


Endocrine/Hematology History: 


   Denies: Hx Anticoagulant Therapy, Hx Diabetes, Hx Thyroid Disease


Cardiovascular History: 


   Denies: Hx Congestive Heart Failure, Hx Deep Vein Thrombosis, Hx Hypertension

, Hx Myocardial Infarction, Hx Pacemaker/ICD


Respiratory History: 


   Denies: Hx Asthma, Hx Chronic Obstructive Pulmonary Disease (COPD), Hx Lung 

Cancer, Hx Pneumonia, Hx Pulmonary Embolism


GI History: 


   Denies: Hx Gall Bladder Disease, Hx Gastrointestinal Bleed, Hx Ulcer, Hx 

Urosepsis


 History: 


   Denies: Hx Kidney Stones, Hx Renal Disease


Neurological History: 


   Denies: Hx Dementia, Hx Migraine, Hx Seizures, Hx Transient Ischemic Attacks 

(TIA)


Psychiatric History: Reports: Hx Substance Abuse


   Denies: Hx Anxiety, Hx Depression, Hx Schizophrenia, Hx Bipolar Disorder





- Surgical History


Surgery Procedure, Year, and Place: SX L WRIST FX.  Dental extractions 11/5/14.


Infectious Disease History: No


Infectious Disease History: 


   Denies: Hx Clostridium Difficile, Hx Hepatitis, Hx Human Immunodeficiency 

Virus (HIV), Hx of Known/Suspected MRSA, Hx Shingles, Hx Tuberculosis, Hx Known/

Suspected VRE, Hx Known/Suspected VRSA, History Other Infectious Disease, 

Traveled Outside the [place] in Last 30 Days





- Family History


Known Family History: Positive: Hypertension, Diabetes


   Negative: Cardiac Disease


Family History: no family history of cardio, pulmonary vascular issues





- Social History


Alcohol Use: None


Substance Use Type: Reports: None


Substance Use Comment - Amount & Last Used: quit marijuana 7 mo ago


Smoking Status (MU): Heavy Every Day Tobacco Smoker


Type: Cigarettes


Amount Used/How Often: 1 PPD


Length of Time of Smoking/Using Tobacco: 10 yrs


Have You Smoked in the Last Year: Yes





Review of Systems


Positive: Dental Pain - Right upper, Other - Positive right sided facial 

swelling


Positive: Headache


All Other Systems Reviewed And Are Negative: Yes





Physical Exam





- Summary


Physical Exam Summary: 





VITAL SIGNS: Reviewed.


GENERAL:  Patient is a well-developed and nourished male who is lying 

comfortable in the stretcher.  Patient is not in any acute respiratory distress.


HEAD AND FACE: No signs of trauma.  No ecchymosis, hematomas or skull 

depressions. No sinus tenderness.


EYES: PERRLA, EOMI x 2, No injected conjunctiva, no nystagmus.


EARS: Hearing grossly intact. Ear canals and tympanic membranes are within 

normal limits.


MOUTH: Diffuse dental cavities. Swelling in the right side of the face. No 

trismus. No swelling tongue. No swelling of the lips. Oropharynx within normal 

limits.


NECK: Supple, trachea is midline, no adenopathy, no JVD, no carotid bruit, no c-

spine tenderness, neck with full ROM.


CHEST: Symmetric, no tenderness at palpation


LUNGS: Clear to auscultation bilaterally. No wheezing or crackles.


CVS: Regular rate and rhythm, S1 and S2 present, no murmurs or gallops 

appreciated.


ABDOMEN: Soft, non-tender. No signs of distention. No rebound no guarding, and 

no masses palpated. Bowel sounds are normal.


EXTREMITIES: FROM in all major joints, no edema, no cyanosis or clubbing.


NEURO: Alert and oriented x 3. No acute neurological deficits. Speech is normal 

and follows commands.


SKIN: Dry and warm





Triage Information Reviewed: Yes


Vital Signs On Initial Exam: 


 Initial Vitals











Temp Pulse Resp BP Pulse Ox


 


 101.2 F   76   16   142/69   97 


 


 08/08/17 07:12  08/08/17 07:12  08/08/17 07:12  08/08/17 07:12  08/08/17 07:12











Vital Signs Reviewed: Yes





Diagnostics





- Vital Signs


 Vital Signs











  Temp Pulse Resp BP Pulse Ox


 


 08/08/17 07:12  101.2 F  76  16  142/69  97














- Laboratory


Lab Statement: Any lab studies that have been ordered have been reviewed, and 

results considered in the medical decision making process.





Complex Multi-Symp Course/Dx


Course Of Treatment: This patient is a 27 year old M BIBA to [facility] with a chief 

complaint of right upper dental pain since three days ago. He states the pain 

is progressively worsening. The patient rates the pain 8/10 in severity 

currently. Symptoms aggravated and alleviated by nothing. Patient reports a 

headache and right sided facial swelling. He presented to [facility] and [facility] 

yesterday for this complaint.  Patient with multiple visit for dental pain. 

Patient has an appointment with dentist today at 1PM. He has slight right 

facial swelling. Possible he is developing an abscess. He was given Toradol and 

placed in Clindamycin and he will discontinue Amoxicillin.  He emphasized in 

the importance to f/u with dentist today. He understands and agrees.  I 

discussed all the findings and test results with the patient. Patient was 

instructed to return to the emergency room immediately if any of the symptoms 

return or worsens. Plan of care was discussed with the patient and understands 

and agrees. All questions were answered at patient satisfaction.  There were no 

further complaints or concerns.  Lung exam before discharge: CTA B/L. Good air 

exchange. No wheezing or crackles heard. CVS: S1 and S2 present. No murmurs 

appreciated. Patient is alert and oriented x 3. Patient is hemodynamically 

stable. Patient will be discharged home with follow up PCP in the next 2-3 days





- Diagnoses


Provider Diagnoses: 


 Dental abscess, Dental cavities, Pain, dental








Discharge





- Discharge Plan


Condition: Stable


Disposition: HOME


Prescriptions: 


Clindamycin HCl [Clindamycin 150 MG CAP*] 150 mg PO QID #28 cap


Patient Education Materials:  Dental Abscess (ED)


Referrals: 


[facility] PHYSICIAN REFERRAL [Outside] - 2 Days





The documentation as recorded by the Rose hernandez Alfonso accurately reflects 

the service I personally performed and the decisions made by me, Layton Fontenot MD.

## 2017-09-04 ENCOUNTER — HOSPITAL ENCOUNTER (INPATIENT)
Dept: HOSPITAL 25 - ED | Age: 27
LOS: 2 days | Discharge: HOME | DRG: 755 | End: 2017-09-06
Attending: PSYCHIATRY & NEUROLOGY | Admitting: PSYCHIATRY & NEUROLOGY
Payer: COMMERCIAL

## 2017-09-04 DIAGNOSIS — Z91.048: ICD-10-CM

## 2017-09-04 DIAGNOSIS — F12.10: ICD-10-CM

## 2017-09-04 DIAGNOSIS — Z79.1: ICD-10-CM

## 2017-09-04 DIAGNOSIS — Z88.8: ICD-10-CM

## 2017-09-04 DIAGNOSIS — F11.10: ICD-10-CM

## 2017-09-04 DIAGNOSIS — F43.25: Primary | ICD-10-CM

## 2017-09-04 DIAGNOSIS — Z79.899: ICD-10-CM

## 2017-09-04 LAB
ALBUMIN SERPL BCG-MCNC: 4.2 G/DL (ref 3.2–5.2)
ALP SERPL-CCNC: 46 U/L (ref 34–104)
ALT SERPL W P-5'-P-CCNC: 15 U/L (ref 7–52)
ANION GAP SERPL CALC-SCNC: 7 MMOL/L (ref 2–11)
APAP SERPL-MCNC: < 15 MCG/ML
AST SERPL-CCNC: 21 U/L (ref 13–39)
BUN SERPL-MCNC: 13 MG/DL (ref 6–24)
BUN/CREAT SERPL: 11 (ref 8–20)
CALCIUM SERPL-MCNC: 9.5 MG/DL (ref 8.6–10.3)
CHLORIDE SERPL-SCNC: 103 MMOL/L (ref 101–111)
GLOBULIN SER CALC-MCNC: 2.5 G/DL (ref 2–4)
GLUCOSE SERPL-MCNC: 125 MG/DL (ref 70–100)
HCO3 SERPL-SCNC: 27 MMOL/L (ref 22–32)
HCT VFR BLD AUTO: 43 % (ref 42–52)
HGB BLD-MCNC: 14.8 G/DL (ref 14–18)
MCH RBC QN AUTO: 31 PG (ref 27–31)
MCHC RBC AUTO-ENTMCNC: 34 G/DL (ref 31–36)
MCV RBC AUTO: 90 FL (ref 80–94)
POTASSIUM SERPL-SCNC: 3.8 MMOL/L (ref 3.5–5)
PROT SERPL-MCNC: 6.7 G/DL (ref 6.4–8.9)
RBC # BLD AUTO: 4.81 10^6/UL (ref 4–5.4)
SALICYLATES SERPL-MCNC: < 2.5 MG/DL (ref ?–30)
SODIUM SERPL-SCNC: 137 MMOL/L (ref 133–145)
TSH SERPL-ACNC: 1.23 MCIU/ML (ref 0.34–5.6)
WBC # BLD AUTO: 8 10^3/UL (ref 3.5–10.8)

## 2017-09-04 PROCEDURE — 85025 COMPLETE CBC W/AUTO DIFF WBC: CPT

## 2017-09-04 PROCEDURE — 80053 COMPREHEN METABOLIC PANEL: CPT

## 2017-09-04 PROCEDURE — 81015 MICROSCOPIC EXAM OF URINE: CPT

## 2017-09-04 PROCEDURE — 84443 ASSAY THYROID STIM HORMONE: CPT

## 2017-09-04 PROCEDURE — 36415 COLL VENOUS BLD VENIPUNCTURE: CPT

## 2017-09-04 PROCEDURE — G0480 DRUG TEST DEF 1-7 CLASSES: HCPCS

## 2017-09-04 PROCEDURE — 81003 URINALYSIS AUTO W/O SCOPE: CPT

## 2017-09-04 PROCEDURE — 80329 ANALGESICS NON-OPIOID 1 OR 2: CPT

## 2017-09-04 PROCEDURE — 80320 DRUG SCREEN QUANTALCOHOLS: CPT

## 2017-09-04 PROCEDURE — 80307 DRUG TEST PRSMV CHEM ANLYZR: CPT

## 2017-09-04 PROCEDURE — 87086 URINE CULTURE/COLONY COUNT: CPT

## 2017-09-04 NOTE — ED
Richard LAMAS Rebecca, scribed for Ace Mora MD on 09/04/17 at 0202 .





Psychiatric Complaint





- HPI Summary


HPI Summary: 


Pt is a 26 y/o M BIB police as a 941 due to suspected SIs. States that his 

girlfriend called the police after being concerned that he was suicidal, though 

he denies SIs. Per medical records, pt has a PMHx of substance abuse. 





- History Of Current Complaint


Chief Complaint: [facility]


Time Seen by Provider: 09/04/17 00:39


Hx Obtained From: Patient, Medical Records


Aggravating Factor(s): Nothing


Alleviating Factor(s): Nothing


Related History: Positive For: Prior Psychiatric Issues - Hx substance abuse


Has Suicidal: Denies: Thoughts





- Allergies/Home Medications


Allergies/Adverse Reactions: 


 Allergies











Allergy/AdvReac Type Severity Reaction Status Date / Time


 


Meloxicam [From Mobic] Allergy Severe Itching Verified 08/07/17 08:50


 


Adhesive Tape Allergy  Rash Verified 08/07/17 08:50


 


Tramadol Allergy  Rash Verified 08/07/17 08:50














PMH/Surg Hx/FS Hx/Imm Hx


Endocrine/Hematology History: 


   Denies: Hx Anticoagulant Therapy, Hx Diabetes, Hx Thyroid Disease


Cardiovascular History: 


   Denies: Hx Congestive Heart Failure, Hx Deep Vein Thrombosis, Hx Hypertension

, Hx Myocardial Infarction, Hx Pacemaker/ICD


Respiratory History: 


   Denies: Hx Asthma, Hx Chronic Obstructive Pulmonary Disease (COPD), Hx Lung 

Cancer, Hx Pneumonia, Hx Pulmonary Embolism


GI History: 


   Denies: Hx Gall Bladder Disease, Hx Gastrointestinal Bleed, Hx Ulcer, Hx 

Urosepsis


 History: 


   Denies: Hx Kidney Stones, Hx Renal Disease


Neurological History: 


   Denies: Hx Dementia, Hx Migraine, Hx Seizures, Hx Transient Ischemic Attacks 

(TIA)


Psychiatric History: Reports: Hx Substance Abuse


   Denies: Hx Anxiety, Hx Depression, Hx Schizophrenia, Hx Bipolar Disorder





- Surgical History


Surgery Procedure, Year, and Place: SX L WRIST FX.  Dental extractions 11/5/14.


Infectious Disease History: No


Infectious Disease History: 


   Denies: Hx Clostridium Difficile, Hx Hepatitis, Hx Human Immunodeficiency 

Virus (HIV), Hx of Known/Suspected MRSA, Hx Shingles, Hx Tuberculosis, Hx Known/

Suspected VRE, Hx Known/Suspected VRSA, History Other Infectious Disease, 

Traveled Outside the [place] in Last 30 Days





- Family History


Known Family History: Positive: Hypertension, Diabetes


   Negative: Cardiac Disease


Family History: no family history of cardio, pulmonary vascular issues





- Social History


Alcohol Use: None


Substance Use Type: Reports: Marijuana


Substance Use Comment - Amount & Last Used: quit marijuana 7 mo ago


Smoking Status (MU): Heavy Every Day Tobacco Smoker


Type: Cigarettes


Amount Used/How Often: 1 PPD


Length of Time of Smoking/Using Tobacco: 10 yrs


Have You Smoked in the Last Year: Yes





Review of Systems


Negative: Fever


Positive: Other - Denies SIs


All Other Systems Reviewed And Are Negative: Yes





Physical Exam


Triage Information Reviewed: Yes


Vital Signs On Initial Exam: 


 Initial Vitals











Temp Pulse Resp BP Pulse Ox


 


 99.6 F   74   18   107/44   97 


 


 09/04/17 01:24  09/04/17 01:24  09/04/17 01:24  09/04/17 01:24  09/04/17 01:24











Vital Signs Reviewed: Yes


Appearance: Positive: Well-Appearing


Skin: Positive: Warm


Head/Face: Positive: Normal Head/Face Inspection


Eyes: Positive: KEENAN


ENT: Positive: Hearing grossly normal


Neck: Positive: Supple


Respiratory/Lung Sounds: Positive: Breath Sounds Present


Cardiovascular: Positive: RRR


Abdomen Description: Positive: Nontender, Soft


Bowel Sounds: Positive: Present


Musculoskeletal: Positive: Strength/ROM Intact


Neurological: Positive: Alert, Oriented to Person Place, Time





- Len Coma Scale


Coma Scale Total: 15





Diagnostics





- Vital Signs


 Vital Signs











  Temp Pulse Resp BP Pulse Ox


 


 09/04/17 01:24  99.6 F  74  18  107/44  97














- Laboratory


Lab Results: 


 Lab Results











  09/04/17 09/04/17 09/04/17 Range/Units





  01:05 01:05 01:05 


 


WBC   8.0   (3.5-10.8)  10^3/ul


 


RBC   4.81   (4.0-5.4)  10^6/ul


 


Hgb   14.8   (14.0-18.0)  g/dl


 


Hct   43   (42-52)  %


 


MCV   90   (80-94)  fL


 


MCH   31   (27-31)  pg


 


MCHC   34   (31-36)  g/dl


 


RDW   13   (10.5-15)  %


 


Plt Count   158   (150-450)  10^3/ul


 


MPV   8   (7.4-10.4)  um3


 


Neut % (Auto)   51.2   (38-83)  %


 


Lymph % (Auto)   35.5   (25-47)  %


 


Mono % (Auto)   11.0 H   (1-9)  %


 


Eos % (Auto)   1.9   (0-6)  %


 


Baso % (Auto)   0.4   (0-2)  %


 


Absolute Neuts (auto)   4.1   (1.5-7.7)  10^3/ul


 


Absolute Lymphs (auto)   2.8   (1.0-4.8)  10^3/ul


 


Absolute Monos (auto)   0.9 H   (0-0.8)  10^3/ul


 


Absolute Eos (auto)   0.2   (0-0.6)  10^3/ul


 


Absolute Basos (auto)   0   (0-0.2)  10^3/ul


 


Absolute Nucleated RBC   0.01   10^3/ul


 


Nucleated RBC %   0.1   


 


Sodium  137    (133-145)  mmol/L


 


Potassium  3.8    (3.5-5.0)  mmol/L


 


Chloride  103    (101-111)  mmol/L


 


Carbon Dioxide  27    (22-32)  mmol/L


 


Anion Gap  7    (2-11)  mmol/L


 


BUN  13    (6-24)  mg/dL


 


Creatinine  1.18 H    (0.67-1.17)  mg/dL


 


Est GFR ( Amer)  96.0    (>60)  


 


Est GFR (Non-Af Amer)  74.6    (>60)  


 


BUN/Creatinine Ratio  11.0    (8-20)  


 


Glucose  125 H    ()  mg/dL


 


Calcium  9.5    (8.6-10.3)  mg/dL


 


Total Bilirubin  0.40    (0.2-1.0)  mg/dL


 


AST  21    (13-39)  U/L


 


ALT  15    (7-52)  U/L


 


Alkaline Phosphatase  46    ()  U/L


 


Total Protein  6.7    (6.4-8.9)  g/dL


 


Albumin  4.2    (3.2-5.2)  g/dL


 


Globulin  2.5    (2-4)  g/dL


 


Albumin/Globulin Ratio  1.7    (1-3)  


 


TSH  1.23    (0.34-5.60)  mcIU/mL


 


Urine Color     


 


Urine Appearance     


 


Urine pH     (5-9)  


 


Ur Specific Gravity     (1.010-1.030)  


 


Urine Protein     (Negative)  


 


Urine Ketones     (Negative)  


 


Urine Blood     (Negative)  


 


Urine Nitrate     (Negative)  


 


Urine Bilirubin     (Negative)  


 


Urine Urobilinogen     (Negative)  


 


Ur Leukocyte Esterase     (Negative)  


 


Urine WBC (Auto)     (Absent)  


 


Urine RBC (Auto)     (Absent)  


 


Urine Bacteria     (Absent)  


 


Urine Glucose     (Negative)  


 


Urine Ascorbic Acid     (Negative)  


 


Salicylates  < 2.50    (<30)  mg/dL


 


Urine Opiates Screen    Presumptive positive H  (None Detect)  


 


Acetaminophen  < 15    mcg/mL


 


Ur Barbiturates Screen    None detected  (None Detect)  


 


Ur Phencyclidine Scrn    None detected  (None Detect)  


 


Ur Amphetamines Screen    None detected  (None Detect)  


 


U Benzodiazepines Scrn    None detected  (None Detect)  


 


Urine Cocaine Screen    None detected  (None Detect)  


 


U Cannabinoids Screen    Presumptive positive H  (None Detect)  


 


Serum Alcohol  < 10    (<10)  mg/dL














  09/04/17 Range/Units





  01:05 


 


WBC   (3.5-10.8)  10^3/ul


 


RBC   (4.0-5.4)  10^6/ul


 


Hgb   (14.0-18.0)  g/dl


 


Hct   (42-52)  %


 


MCV   (80-94)  fL


 


MCH   (27-31)  pg


 


MCHC   (31-36)  g/dl


 


RDW   (10.5-15)  %


 


Plt Count   (150-450)  10^3/ul


 


MPV   (7.4-10.4)  um3


 


Neut % (Auto)   (38-83)  %


 


Lymph % (Auto)   (25-47)  %


 


Mono % (Auto)   (1-9)  %


 


Eos % (Auto)   (0-6)  %


 


Baso % (Auto)   (0-2)  %


 


Absolute Neuts (auto)   (1.5-7.7)  10^3/ul


 


Absolute Lymphs (auto)   (1.0-4.8)  10^3/ul


 


Absolute Monos (auto)   (0-0.8)  10^3/ul


 


Absolute Eos (auto)   (0-0.6)  10^3/ul


 


Absolute Basos (auto)   (0-0.2)  10^3/ul


 


Absolute Nucleated RBC   10^3/ul


 


Nucleated RBC %   


 


Sodium   (133-145)  mmol/L


 


Potassium   (3.5-5.0)  mmol/L


 


Chloride   (101-111)  mmol/L


 


Carbon Dioxide   (22-32)  mmol/L


 


Anion Gap   (2-11)  mmol/L


 


BUN   (6-24)  mg/dL


 


Creatinine   (0.67-1.17)  mg/dL


 


Est GFR ( Amer)   (>60)  


 


Est GFR (Non-Af Amer)   (>60)  


 


BUN/Creatinine Ratio   (8-20)  


 


Glucose   ()  mg/dL


 


Calcium   (8.6-10.3)  mg/dL


 


Total Bilirubin   (0.2-1.0)  mg/dL


 


AST   (13-39)  U/L


 


ALT   (7-52)  U/L


 


Alkaline Phosphatase   ()  U/L


 


Total Protein   (6.4-8.9)  g/dL


 


Albumin   (3.2-5.2)  g/dL


 


Globulin   (2-4)  g/dL


 


Albumin/Globulin Ratio   (1-3)  


 


TSH   (0.34-5.60)  mcIU/mL


 


Urine Color  Yellow  


 


Urine Appearance  Clear  


 


Urine pH  5.0  (5-9)  


 


Ur Specific Gravity  1.027  (1.010-1.030)  


 


Urine Protein  2+(100 mg/dl) H  (Negative)  


 


Urine Ketones  Negative  (Negative)  


 


Urine Blood  Negative  (Negative)  


 


Urine Nitrate  Negative  (Negative)  


 


Urine Bilirubin  Negative  (Negative)  


 


Urine Urobilinogen  Negative  (Negative)  


 


Ur Leukocyte Esterase  Trace H  (Negative)  


 


Urine WBC (Auto)  1+(6-10/hpf) H  (Absent)  


 


Urine RBC (Auto)  1+(3-5/hpf) H  (Absent)  


 


Urine Bacteria  Absent  (Absent)  


 


Urine Glucose  Negative  (Negative)  


 


Urine Ascorbic Acid  * H  (Negative)  


 


Salicylates   (<30)  mg/dL


 


Urine Opiates Screen   (None Detect)  


 


Acetaminophen   mcg/mL


 


Ur Barbiturates Screen   (None Detect)  


 


Ur Phencyclidine Scrn   (None Detect)  


 


Ur Amphetamines Screen   (None Detect)  


 


U Benzodiazepines Scrn   (None Detect)  


 


Urine Cocaine Screen   (None Detect)  


 


U Cannabinoids Screen   (None Detect)  


 


Serum Alcohol   (<10)  mg/dL











Result Diagrams: 


 09/04/17 01:05





 09/04/17 01:05


Lab Statement: Any lab studies that have been ordered have been reviewed, and 

results considered in the medical decision making process.





Course/Dx





- Course


Assessment/Plan: Pt is a 26 y/o M BIB police as a 941 due to suspected SIs. 

States that his girlfriend called the police after being concerned that he was 

suicidal, though he denies SIs. Per medical records, pt has a PMHx of substance 

abuse. After [facility] and consultation with Dr. Pavon (psychiatrist) it has been 

determined that the pt will be admitted. Due to the MHU being full, pt will be 

transferred.





- Differential Dx/Clinical Impression


Provider Diagnosis: 


 Psychosis








Discharge





- Discharge Plan


Condition: Stable


Disposition: TRANS HIGHER LVL OF CARE FAC


Referrals: 


No Primary Care Phys,NOPCP [Primary Care Provider] - 





The documentation as recorded by the Richard hernandez Rebecca accurately 

reflects the service I personally performed and the decisions made by me, 

Ace Mora MD.

## 2017-09-05 RX ADMIN — THERA TABS SCH TAB: TAB at 08:29
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
## 2017-09-06 VITALS — DIASTOLIC BLOOD PRESSURE: 69 MMHG | SYSTOLIC BLOOD PRESSURE: 126 MMHG

## 2017-09-06 RX ADMIN — THERA TABS SCH TAB: TAB at 08:30

## 2017-09-06 NOTE — PN
MHU: Group Therapy Note





- Service Type


Service Type: 43927 Group Psychotherapy - Cognitive Behavioral Group Therapy (

CBT):Patient was attentive and participatory in CBT programming this morning, 

and remained in good behavioral control.  Patient expressed positive insights 

regarding relevant treatment interventions and goals.

## 2017-09-06 NOTE — DS
Subjective





- Subjective


Service Types: [address] [facility] Day Mgmt simple under 30 min


Subjective: 





Patient noted to be visible most of the day in the milieu, pleasant, social 

with peers and attending groups. Patient reports ongoing


benefit to mood and easy agitation. Patient denies med s/e's. Patient reports 

feeling ready for discharge. Patient is A&Ox4, linear and GD in 


TP, and future oriented in TC. Patient reports interest in getting back into 

his music. He has been seen many times playing the guitar in the milieu


through the admission. Patient again denies SI/HI and AH/VH. Patient is 

psychiatrically stable. Discharge plan has been discussed and patient is


amenable and acknowledges understanding. Patient instructed to call the crisis 

hotline, 911, or self present to a local ED if SI recurs. Patient 


was amenable and acknowledged understanding of family and community supports. 

Patient will be discharge home with his mother who has come


to pick him up





Objective





- Appearance


Appearance: Well Developed/Nourished, Healthy Appearing, Thin Framed


Dysmorphic Features: No


Hygiene: Normal


Grooming: Fairly Well Kept





- Behavior


Psychomotor Activities: Normal


Exhibits Abnormal Movement: No





- Attitude and Relatedness


Attitude and Relatedness: Cooperative


Eye Contact: Good





- Speech


Quality: Unpressured


Latencies: Normal


Quantity: Appropriate





- Mood


Patient's Decription of Mood: "Good"





- Affect


Observed Affect: Good


Affect Consistent with: Euthymia





- Thought Process


Patient's Thought Process: Coherent


Thought Content: No Passive Death Wish, No Suicidal Planning, No Homicidal 

Ideation, No Paranoid Ideation





- Sensorium


Experiencing Hallucinations: No, Sensorium is Clear


Type of Hallucinations: Visual: No, Auditory: No, Command: No





- Level of Consciousness


Level of Consciousness: Alert


Orientation: Yes Intact, Yes Orientated to Time, Yes Orientated to Place, Yes 

Orientated to Person





- Impulse Control


Impulse Control: Intact





- Insight and Judgement


Insight and Judgement: Fair





- Group Participation


Particating in Group Activities: Yes





- Medication Management


Medication Management Adherence: Yes





Treatment Course & Assessment


Clinical Course & Impression: 








HOSPITAL COURSE:


-------------------------


Patient is a 28yo male with no significant PPHx. Patient was brought to the [facility] 

ED on 9.41 by police after they were called to patient's home by his GF. GF


reported to police suicidal statements made by patient including "I just can't 

deal with it anymore"..."I'm gonna slash my throat". Patient denies making 


suicidal statements. He does report that he and his GF were in a heated verbal 

altercation that night. He reports the argument was over his belief that she was


cheating. Patient does admit to snorting heroin at the house of a friend prior 

to coming home and starting this altercation. He reports he'd been upset about


his recent arrest this past Saturday for possession of marijuana. Patient 

reports he spent the next day in [facility]. He reports after snorting the Heroin he 

was 


consumed with the idea that his GF cheated on him the day he was in [facility]. 

Patient endorses making a statement similar to "I just can't deal with it 

anymore",


but reports he meant their relationship, not living. Police and GF report 

patient was carrying a knife walking back and forth from his home to his car. 

Patient


report he had no knife, but police did approach him while he was in his car 

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 





On interview, patient is full in affect and linear in TP. He reports no hx of 

suicide attempt. He is focused on getting home to his GF and 2yo son. Patient 

denies


depression and anxiety. He endorses daily cannabis use, but reports his use of 

powder heroin on day of presentation was is first. Patient reports no issues


with sleep. He denies feelings of hopelessness and helplessness. Patient is 

employed and upset about losing one of his 2 jobs due to this admission. Patient


is interested in paperwork to verify his hospitalization for employers. Patient 

reports no symptoms of psychosis nor were any elicited on interview.








PERTINENT LABS:


--------------------


 Laboratory Tests











  09/04/17 09/04/17 09/04/17





  01:05 01:05 01:05


 


WBC   8.0 


 


RBC   4.81 


 


Hgb   14.8 


 


Hct   43 


 


MCV   90 


 


MCH   31 


 


MCHC   34 


 


RDW   13 


 


Plt Count   158 


 


MPV   8 


 


Neut % (Auto)   51.2 


 


Lymph % (Auto)   35.5 


 


Mono % (Auto)   11.0 H 


 


Eos % (Auto)   1.9 


 


Baso % (Auto)   0.4 


 


Absolute Neuts (auto)   4.1 


 


Absolute Lymphs (auto)   2.8 


 


Absolute Monos (auto)   0.9 H 


 


Absolute Eos (auto)   0.2 


 


Absolute Basos (auto)   0 


 


Absolute Nucleated RBC   0.01 


 


Nucleated RBC %   0.1 


 


Sodium  137  


 


Potassium  3.8  


 


Chloride  103  


 


Carbon Dioxide  27  


 


Anion Gap  7  


 


BUN  13  


 


Creatinine  1.18 H  


 


Est GFR ( Amer)  96.0  


 


Est GFR (Non-Af Amer)  74.6  


 


BUN/Creatinine Ratio  11.0  


 


Glucose  125 H  


 


Calcium  9.5  


 


Total Bilirubin  0.40  


 


AST  21  


 


ALT  15  


 


Alkaline Phosphatase  46  


 


Total Protein  6.7  


 


Albumin  4.2  


 


Globulin  2.5  


 


Albumin/Globulin Ratio  1.7  


 


TSH  1.23  


 


Urine Color   


 


Urine Appearance   


 


Urine pH   


 


Ur Specific Gravity   


 


Urine Protein   


 


Urine Ketones   


 


Urine Blood   


 


Urine Nitrate   


 


Urine Bilirubin   


 


Urine Urobilinogen   


 


Ur Leukocyte Esterase   


 


Urine WBC (Auto)   


 


Urine RBC (Auto)   


 


Urine Bacteria   


 


Urine Glucose   


 


Urine Ascorbic Acid   


 


Salicylates  < 2.50  


 


Urine Opiates Screen    Presumptive positive H


 


Acetaminophen  < 15  


 


Ur Barbiturates Screen    None detected


 


Ur Phencyclidine Scrn    None detected


 


Ur Amphetamines Screen    None detected


 


U Benzodiazepines Scrn    None detected


 


Urine Cocaine Screen    None detected


 


U Cannabinoids Screen    Presumptive positive H


 


Serum Alcohol  < 10  














  09/04/17





  01:05


 


WBC 


 


RBC 


 


Hgb 


 


Hct 


 


MCV 


 


MCH 


 


MCHC 


 


RDW 


 


Plt Count 


 


MPV 


 


Neut % (Auto) 


 


Lymph % (Auto) 


 


Mono % (Auto) 


 


Eos % (Auto) 


 


Baso % (Auto) 


 


Absolute Neuts (auto) 


 


Absolute Lymphs (auto) 


 


Absolute Monos (auto) 


 


Absolute Eos (auto) 


 


Absolute Basos (auto) 


 


Absolute Nucleated RBC 


 


Nucleated RBC % 


 


Sodium 


 


Potassium 


 


Chloride 


 


Carbon Dioxide 


 


Anion Gap 


 


BUN 


 


Creatinine 


 


Est GFR ( Amer) 


 


Est GFR (Non-Af Amer) 


 


BUN/Creatinine Ratio 


 


Glucose 


 


Calcium 


 


Total Bilirubin 


 


AST 


 


ALT 


 


Alkaline Phosphatase 


 


Total Protein 


 


Albumin 


 


Globulin 


 


Albumin/Globulin Ratio 


 


TSH 


 


Urine Color  Yellow


 


Urine Appearance  Clear


 


Urine pH  5.0


 


Ur Specific Gravity  1.027


 


Urine Protein  2+(100 mg/dl) H


 


Urine Ketones  Negative


 


Urine Blood  Negative


 


Urine Nitrate  Negative


 


Urine Bilirubin  Negative


 


Urine Urobilinogen  Negative


 


Ur Leukocyte Esterase  Trace H


 


Urine WBC (Auto)  1+(6-10/hpf) H


 


Urine RBC (Auto)  1+(3-5/hpf) H


 


Urine Bacteria  Absent


 


Urine Glucose  Negative


 


Urine Ascorbic Acid  * H


 


Salicylates 


 


Urine Opiates Screen 


 


Acetaminophen 


 


Ur Barbiturates Screen 


 


Ur Phencyclidine Scrn 


 


Ur Amphetamines Screen 


 


U Benzodiazepines Scrn 


 


Urine Cocaine Screen 


 


U Cannabinoids Screen 


 


Serum Alcohol 











Consultants:


------------ 


none








Discharge Meds:


----------------- 


NONE








Follow-Up:


----------------


Appt. for within the next 2 weeks scheduled by JUD  with  provider([facility]








Discharge Planning





- Discharge Planning


Discharge Plan: Outpatient Follow Up


Outpatient Program: [facility]


Recommendations for Continuing Care: Psychotherapy, Substance Abuse Counseling


Medications: 


 Current Medications





Acetaminophen (Tylenol Tab*)  650 mg PO Q4H PRN


   PRN Reason: PAIN or TEMP > 101 F


Al Hydrox/Mg Hydrox/Simethicone (Maalox Plus*)  30 ml PO Q4H PRN


   PRN Reason: INDIGESTION


Device (Nicotine Mouth Piece*)  1 each INH .CARTRIDGE [facility]


   Last Admin: 09/04/17 20:25 Dose:  1 each


Multivitamins (Theragran Tab*)  1 tab PO DAILY [facility]


   Last Admin: 09/06/17 08:30 Dose:  1 tab


Nicotine (Nicotine Inhaler*)  10 mg INH Q2H PRN


   PRN Reason: CRAVING


   Last Admin: 09/05/17 21:19 Dose:  10 mg








Discharge Planning: 


Prescriptions provided for discharge                  [x] Yes   [] No   





Follow up care details as per social work arrangements.


Patient response to discharge plan:   


                                                                 [] eager for 

discharge


                                    [x] agreeable with discharge plan


                                  [] ambivalent about discharge


                                   [] disagrees with discharge today

## 2017-09-06 NOTE — CONS
PSYCHOLOGICAL REPORT:

 

DATE OF CONSULTATION:  9/6/17



DATE OF DICTATION:  09/06/17

 

REASON FOR REFERRAL:  Micah was referred for psychological testing secondary to 
concerns regarding possible depression and lethality.

 

TESTS ADMINISTERED:  Micah completed the [place] Multiphasic Personality 
Inventory-2 (MMPI-2).  He was given feedback and individual conversation 
regarding testing results and was also seen in the context of group cognitive 
behavioral psychotherapy on 2 occasions by this writer.

 

RELEVANT HISTORY:  Micah is educated through the 9th grade, but has completed a 
GED by the time he was 17.  Of late, he has worked with a local contractor as 
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 
. He has a 3-year-old son with his current girlfriend as well as 
a recent history of arrest for approximately an ounce of marijuana, which he 
had in his vehicle when he was pulled over at approximately 3:30 a.m. recently.
  He describes how the  explained pulling him over secondary to a 
light being out on the tail of his car and then smelling marijuana leading to 
his arrest.

 

After being released, he describes for the first time using powdered heroin 
before returning home to his girlfriend.  He apparently became somewhat 
paranoid secondary to his intoxication and was fixated on the belief that his 
girlfriend had cheated on him while he was incarcerated.  He began to express 
suicidal thoughts such as "I am going to slit my throat" and stating other 
vague statements such as "I just can't deal with this anymore."  He denies 
having had any suicidal intentions and had simply lost his temper in describing 
his inability to deal with feeling rejected by his girlfriend.

 

Since his admission here, they apparently reconciled and he has been able to 
describe prosocial goals and interest in both repairing their relationship as 
well as being an attentive father for the 3-year-old son.  He is eager and 
interested to return to work as well as to his family roles.  He reports good 
insight regarding adverse effects of his drug use and has at least agreed to 
comply with and take appointment for substance abuse.

 

BEHAVIORAL OBSERVATIONS:  Micah is a 27-year-old  male who was 
attentive and participatory in group programming.  He was very interested in 
discussion of test results, both in terms of clinical interest as well as how 
conclusions were arrived at.  He responded positively to discussion, 
encouraging primary treatment regarding substance abuse as a primary clinical 
indication as he does not elevate any clinical indices on this administration 
of the MMPI-2.

 

TEST RESULTS:  Micah provides a valid protocol on this administration of 
testing and as aforementioned, has not elevated any of the clinical indices.  
Discussion reinforced the dangerousness of drug use, particularly heroin and 
how that can lead to escalating problems including immediate death.  Micah was 
responsive to this discussion and emphasized his interest in being a good 
father for his son.

 

RECOMMENDATIONS:  Reinforced followup with substance abuse counseling as the 
primary point of interest as he currently is not depressed and does not seem to 
experience any personality disorder problems in the testing context at least.  
It is hoped that he will comply with his initial intake and curtail further 
drug use.

 

IMPRESSION AND RECOMMENDATIONS:  Primary diagnosis of polysubstance abuse. 
Concerns regarding lethality are not apparent in either testing context or in 
the patient's report of historical difficulties or of current emotional 
experience.

 

 [id number]/CPS #: [id number]

RIVKA

## 2018-06-01 ENCOUNTER — HOSPITAL ENCOUNTER (EMERGENCY)
Dept: HOSPITAL 25 - UCEAST | Age: 28
Discharge: HOME | End: 2018-06-01
Payer: COMMERCIAL

## 2018-06-01 VITALS — SYSTOLIC BLOOD PRESSURE: 110 MMHG | DIASTOLIC BLOOD PRESSURE: 65 MMHG

## 2018-06-01 DIAGNOSIS — Z88.8: ICD-10-CM

## 2018-06-01 DIAGNOSIS — Z82.49: ICD-10-CM

## 2018-06-01 DIAGNOSIS — F17.210: ICD-10-CM

## 2018-06-01 DIAGNOSIS — Z91.048: ICD-10-CM

## 2018-06-01 DIAGNOSIS — Z88.5: ICD-10-CM

## 2018-06-01 DIAGNOSIS — Z83.3: ICD-10-CM

## 2018-06-01 DIAGNOSIS — K08.89: Primary | ICD-10-CM

## 2018-06-01 PROCEDURE — 99212 OFFICE O/P EST SF 10 MIN: CPT

## 2018-06-01 PROCEDURE — G0463 HOSPITAL OUTPT CLINIC VISIT: HCPCS

## 2018-06-01 NOTE — UC
Dental HPI





- HPI Summary


HPI Summary: 


Patient is a 27-year-old male presenting to the  with chief complaint of 

right upper dental pain 2 weeks.  He endorses chills, but denies any fever 

sweats.  He is on Suboxone and has been taking Tylenol and ibuprofen with a 

mild amount of relief.  He states he needs to have his entire upper teeth 

pulled but is scared and has not made an appointment with a dentist.  He is 

dealing with intermittent dental infections for several years.  


 





- History of Current Complaint


Chief Complaint: UCDentalProblem


Stated Complaint: TOOTH ACHE


Time Seen by Provider: 06/01/18 19:20


Hx Obtained From: Patient


Onset/Duration: Sudden Onset


Severity: Moderate


Pain Intensity: 5


Pain Scale Used: 0-10 Numeric





- Allergies/Home Medications


Allergies/Adverse Reactions: 


 Allergies











Allergy/AdvReac Type Severity Reaction Status Date / Time


 


Adhesive Tape Allergy  Rash Verified 09/04/17 21:11


 


meloxicam Allergy  Itching Verified 06/01/18 19:23


 


tramadol Allergy  Rash Verified 06/01/18 19:23











Home Medications: 


 Home Medications





Acetaminophen [Tylenol] 500 mg PO Q8HR PRN 06/01/18 [History Confirmed 06/01/18]


Buprenorphine HCl/Naloxone HCl [Suboxone] 1 mis SL DAILY 06/01/18 [History 

Confirmed 06/01/18]


Gabapentin 300 mg PO TID 06/01/18 [History Confirmed 06/01/18]


Ibuprofen [Ibu] 600 mg PO Q8HR PRN 06/01/18 [History Confirmed 06/01/18]











PMH/Surg Hx/FS Hx/Imm Hx


Previously Healthy: Yes


Other History Of: 


   Negative For: HIV, Hepatitis B, Hepatitis C, Anticoagulant Therapy





- Surgical History


Surgical History: None


Surgery Procedure, Year, and Place: SX L WRIST FX.  Dental extractions 11/5/14.





- Family History


Known Family History: Positive: Hypertension, Diabetes


   Negative: Cardiac Disease


Family History: no family history of cardio, pulmonary vascular issues





- Social History


Occupation: Unemployed


Lives: Alone


Alcohol Use: Rare


Substance Use Type: Prescribed


Substance Use Comment - Amount & Last Used: quit marijuana 7 mo ago


Smoking Status (MU): Heavy Every Day Tobacco Smoker


Type: Cigarettes


Amount Used/How Often: 1/2 ppd


Length of Time of Smoking/Using Tobacco: 10 yrs


Have You Smoked in the Last Year: Yes


When Did the Patient Quit Smoking/Using Tobacco: taking Chantix


Household Exposure Type: Cigarettes





- Immunization History


Most Recent Influenza Vaccination: 2015/2016


Most Recent Tetanus Shot: 2011


Most Recent Pneumonia Vaccination: Na





Review of Systems


Constitutional: Negative


Skin: Negative


ENT: Dental Pain


Respiratory: Negative


Cardiovascular: Negative


Motor: Negative


Neurovascular: Negative


Neurological: Negative


Psychological: Negative


Is Patient Immunocompromised?: No


All Other Systems Reviewed And Are Negative: Yes





Physical Exam


Triage Information Reviewed: Yes


Appearance: Well-Appearing, Well-Nourished


Vital Signs: 


 Initial Vital Signs











Temp  99.8 F   06/01/18 19:21


 


Pulse  67   06/01/18 19:21


 


Resp  18   06/01/18 19:21


 


BP  110/65   06/01/18 19:21


 


Pulse Ox  98   06/01/18 19:21











Vital Signs Reviewed: Yes


Eye Exam: Normal


Dental: Positive: Percussion Tenderness @ - right upper, Gross Decay/Caries @ - 

throughout, Dental Fracture @ - throughout, Other: - no obvious signs of 

infection/abscess


Neck exam: Normal


Neck: Positive: Supple


Cardiovascular Exam: Normal


Cardiovascular: Positive: RRR


Musculoskeletal Exam: Normal


Musculoskeletal: Positive: Strength Intact


Neurological Exam: Normal


Psychological: Positive: Normal Response To Family


Skin Exam: Normal





Dental Complaint Course/Dx





- Course


Course Of Treatment: During the course of treatment, the patient's evaluated 

for upper dental pain.  He denies any radiation of pain.  He has been taking 

ibuprofen and Tylenol without much relief.  He has not been on antibiotic.  He 

is not seen a dentist.  He states all of his teeth are broken and needs to be 

pulled.  There is no obvious signs of infection, but due to pain, I will place 

the patient on penicillin 4 times daily 7 days.





- Differential Dx/Diagnosis


Differential Diagnosis/Dx: Dental Abscess, Dental Caries, Odontogenic Pain


Provider Diagnoses: Dental pain





Discharge





- Sign-Out/Discharge


Documenting (check all that apply): Discharge/Admit/Transfer





- Discharge Plan


Condition: Stable


Disposition: HOME


Prescriptions: 


Penicillin  MG TAB(NF) [Penicillin  mg Tab(NF)] 500 mg PO QID #28 

tab MDD 4


Patient Education Materials:  Dental Abscess (ED), Toothache (ED)


Referrals: 


No Primary Care Phys,NOPCP [Primary Care Provider] - 


Additional Instructions: 


Penicillin four times daily x 7 days


Ambesol over the counter





- Billing Disposition and Condition


Condition: STABLE


Disposition: HOME

## 2019-01-03 ENCOUNTER — HOSPITAL ENCOUNTER (EMERGENCY)
Dept: HOSPITAL 25 - ED | Age: 29
Discharge: HOME | End: 2019-01-03
Payer: COMMERCIAL

## 2019-01-03 VITALS — DIASTOLIC BLOOD PRESSURE: 61 MMHG | SYSTOLIC BLOOD PRESSURE: 113 MMHG

## 2019-01-03 DIAGNOSIS — N50.811: Primary | ICD-10-CM

## 2019-01-03 DIAGNOSIS — N45.2: ICD-10-CM

## 2019-01-03 DIAGNOSIS — F17.210: ICD-10-CM

## 2019-01-03 PROCEDURE — 87491 CHLMYD TRACH DNA AMP PROBE: CPT

## 2019-01-03 PROCEDURE — 96372 THER/PROPH/DIAG INJ SC/IM: CPT

## 2019-01-03 PROCEDURE — 76870 US EXAM SCROTUM: CPT

## 2019-01-03 PROCEDURE — 99282 EMERGENCY DEPT VISIT SF MDM: CPT

## 2019-01-03 PROCEDURE — 87591 N.GONORRHOEAE DNA AMP PROB: CPT

## 2019-01-03 PROCEDURE — 81003 URINALYSIS AUTO W/O SCOPE: CPT

## 2019-01-03 NOTE — ED
GI/ HPI





- HPI Summary


HPI Summary: 


28-year-old male presents with right testicular pain for the past 2 months.  He 

states the past couple days gotten worse.  He states that seems to radiate into 

his abdomen.  He admits to some pain when he finishes urinating.  He denies any 

urgency frequency.  No fevers.  No nausea vomiting.  He states he does have a 

history of chlamydia.  He states that his fianc was just diagnosed with some 

kind of pelvic infection.  








- History of Current Complaint


Chief Complaint: EDUrogenitalProblems


Time Seen by Provider: 01/03/19 11:49


Stated Complaint: GROIN PAIN


Pain Intensity: 5





- Additional Pertinent History


Primary Care Physician: [id number]





- Allergy/Home Medications


Allergies/Adverse Reactions: 


 Allergies











Allergy/AdvReac Type Severity Reaction Status Date / Time


 


Adhesive Tape Allergy  Rash Verified 09/04/17 21:11


 


meloxicam Allergy  Itching Verified 06/01/18 19:23


 


tramadol Allergy  Rash Verified 06/01/18 19:23














PMH/Surg Hx/FS Hx/Imm Hx


Endocrine/Hematology History: 


   Denies: Hx Anticoagulant Therapy, Hx Diabetes, Hx Thyroid Disease


Cardiovascular History: 


   Denies: Hx Congestive Heart Failure, Hx Deep Vein Thrombosis, Hx Hypertension

, Hx Myocardial Infarction, Hx Pacemaker/ICD


Respiratory History: 


   Denies: Hx Asthma, Hx Chronic Obstructive Pulmonary Disease (COPD), Hx Lung 

Cancer, Hx Pneumonia, Hx Pulmonary Embolism


GI History: 


   Denies: Hx Gall Bladder Disease, Hx Gastrointestinal Bleed, Hx Ulcer, Hx 

Urosepsis


 History: 


   Denies: Hx Kidney Stones, Hx Renal Disease


Sensory History: 


   Denies: Hx Contacts or Glasses, Hx Hearing Aid


Opthamlomology History: 


   Denies: Hx Contacts or Glasses


Neurological History: 


   Denies: Hx Dementia, Hx Migraine, Hx Seizures, Hx Transient Ischemic Attacks 

(TIA)


Psychiatric History: Reports: Hx Substance Abuse


   Denies: Hx Anxiety, Hx Depression, Hx Schizophrenia, Hx Bipolar Disorder





- Surgical History


Surgery Procedure, Year, and Place: SX L WRIST FX.  Dental extractions 11/5/14.


Infectious Disease History: No


Infectious Disease History: 


   Denies: Hx Clostridium Difficile, Hx Hepatitis, Hx Human Immunodeficiency 

Virus (HIV), Hx of Known/Suspected MRSA, Hx Shingles, Hx Tuberculosis, Hx Known/

Suspected VRE, Hx Known/Suspected VRSA, History Other Infectious Disease, 

Traveled Outside the [place] in Last 30 Days





- Family History


Known Family History: Positive: Hypertension, Diabetes


   Negative: Cardiac Disease


Family History: no family history of cardio, pulmonary vascular issues





- Social History


Alcohol Use: Rare


Substance Use Type: Reports: Prescribed


Substance Use Comment - Amount & Last Used: quit marijuana 7 mo ago


Smoking Status (MU): Heavy Every Day Tobacco Smoker


Type: Cigarettes


Amount Used/How Often: 1/2 ppd


Length of Time of Smoking/Using Tobacco: 10 yrs


Have You Smoked in the Last Year: Yes





Review of Systems


Negative: Fever


Negative: Chest Pain


Negative: Shortness Of Breath


Positive: Other - right sided testicular pain


Positive: dysuria


All Other Systems Reviewed And Are Negative: Yes





Physical Exam


Triage Information Reviewed: Yes


Vital Signs On Initial Exam: 


 Initial Vitals











Temp Pulse Resp BP Pulse Ox


 


 98 F   84   18   116/72   97 


 


 01/03/19 10:38  01/03/19 10:38  01/03/19 10:38  01/03/19 10:38  01/03/19 10:38











Vital Signs Reviewed: Yes


Appearance: Positive: Well-Appearing


Skin: Positive: Warm, Dry


Head/Face: Positive: Normal Head/Face Inspection


Eyes: Positive: Normal, Conjunctiva Clear


ENT: Positive: Pharynx normal


Respiratory/Lung Sounds: Positive: Clear to Auscultation, Breath Sounds Present


Cardiovascular: Positive: Normal, RRR


Abdomen Description: Positive: Nontender, Soft


Bowel Sounds: Positive: Present


Male Genital Exam: Positive: Normal Genitalia, Testicular Tenderness (R)


Musculoskeletal: Positive: Normal


Neurological: Positive: Normal


Psychiatric: Positive: Normal





Diagnostics





- Vital Signs


 Vital Signs











  Temp Pulse Resp BP Pulse Ox


 


 01/03/19 11:57  98.5 F  70  18  110/68  97


 


 01/03/19 10:38  98 F  84  18  116/72  97














- Laboratory


Lab Results: 


 Lab Results











  01/03/19 Range/Units





  11:40 


 


Urine Color  Delicia  


 


Urine Appearance  Clear  


 


Urine pH  5.0  (5-9)  


 


Ur Specific Gravity  1.026  (1.010-1.030)  


 


Urine Protein  Negative  (Negative)  


 


Urine Ketones  Trace A  (Negative)  


 


Urine Blood  Negative  (Negative)  


 


Urine Nitrate  Negative  (Negative)  


 


Urine Bilirubin  Negative  (Negative)  


 


Urine Urobilinogen  Negative  (Negative)  


 


Ur Leukocyte Esterase  Negative  (Negative)  


 


Urine Glucose  Negative  (Negative)  











Lab Statement: Any lab studies that have been ordered have been reviewed, and 

results considered in the medical decision making process.





- Ultrasound


  ** No standard instances


Ultrasound Interpretation Completed By: Radiologist


Summary of Ultrasound Findings: IMPRESSION:  1. NO TESTICULAR PARENCHYMAL MASS.

  2. NO SONOGRAPHIC FEATURES OF TORSION. PLEASE NOTE THAT PARTIAL OR 

INTERMITTENT TORSION.  MAY BE SONOGRAPHICALLY NORMAL.  3. THE RIGHT TESTICLE IS 

HYPERVASCULAR COMPARED TO THE LEFT WHICH CAN BE SEEN WITH.  ORCHITIS.  4. THERE 

ARE SCATTERED TESTICULAR CALCIFICATIONS BILATERALLY NOT MEETING THE CRITERIA 

FOR.  TESTICULAR MICROLITHIASIS.





GIGU Course/Dx





- Course


Course Of Treatment: 28-year-old male presents with right testicular pain for 

the past 2 months.  He states the past couple days gotten worse.  He states 

that seems to radiate into his abdomen.  He admits to some pain when he 

finishes urinating.  He denies any urgency frequency.  No fevers.  No nausea 

vomiting.  He states he does have a history of chlamydia.  He states that his 

fianc was just diagnosed with some kind of pelvic infection.  On exam 

tenderness over right testicle.  Nontender abdomen.  Ultrasound shows orchitis.

  gave dosed Rocephin and will place on doxycycline.  Told to follow up with 

urology.  Patient understands agrees plan.





- Diagnoses


Differential Diagnoses - Male: Orchitis, STD, Urinary Tract Infection


Provider Diagnoses: 


 Orchitis








Discharge





- Sign-Out/Discharge


Documenting (check all that apply): Patient Departure





- Discharge Plan


Condition: Good


Disposition: HOME


Prescriptions: 


DOXYcycline CAP(*) [DOXYcycline 100MG CAP(*)] 100 mg PO BID #19 cap


Patient Education Materials:  Orchitis (ED)


Forms:  *Work Release


Referrals: 


Tatianna Longoria MD [Primary Care Provider] - 


Selvin aSl MD [Medical Doctor] - 


Additional Instructions: 


take doxycycline twice a day for 10 days


follow up with urology


Return to ED if develop any new or worsening symptoms





- Billing Disposition and Condition


Condition: GOOD


Disposition: Home